# Patient Record
Sex: FEMALE | Race: WHITE | NOT HISPANIC OR LATINO
[De-identification: names, ages, dates, MRNs, and addresses within clinical notes are randomized per-mention and may not be internally consistent; named-entity substitution may affect disease eponyms.]

---

## 2016-03-22 RX ORDER — SUCRALFATE 1 G
1 TABLET ORAL
Qty: 0 | Refills: 0 | COMMUNITY
Start: 2016-03-22

## 2017-01-17 ENCOUNTER — APPOINTMENT (OUTPATIENT)
Dept: BREAST CENTER | Facility: CLINIC | Age: 82
End: 2017-01-17

## 2017-01-17 VITALS
HEART RATE: 78 BPM | WEIGHT: 132 LBS | SYSTOLIC BLOOD PRESSURE: 132 MMHG | BODY MASS INDEX: 21.99 KG/M2 | DIASTOLIC BLOOD PRESSURE: 76 MMHG | HEIGHT: 65 IN

## 2017-01-17 DIAGNOSIS — R41.3 OTHER AMNESIA: ICD-10-CM

## 2017-01-17 DIAGNOSIS — Z86.2 PERSONAL HISTORY OF DISEASES OF THE BLOOD AND BLOOD-FORMING ORGANS AND CERTAIN DISORDERS INVOLVING THE IMMUNE MECHANISM: ICD-10-CM

## 2017-01-17 DIAGNOSIS — Z86.79 PERSONAL HISTORY OF OTHER DISEASES OF THE CIRCULATORY SYSTEM: ICD-10-CM

## 2017-01-17 DIAGNOSIS — Z87.19 PERSONAL HISTORY OF OTHER DISEASES OF THE DIGESTIVE SYSTEM: ICD-10-CM

## 2017-01-17 DIAGNOSIS — F15.90 OTHER STIMULANT USE, UNSPECIFIED, UNCOMPLICATED: ICD-10-CM

## 2017-01-17 DIAGNOSIS — I72.9 ANEURYSM OF UNSPECIFIED SITE: ICD-10-CM

## 2017-01-17 DIAGNOSIS — N63 UNSPECIFIED LUMP IN BREAST: ICD-10-CM

## 2017-01-17 DIAGNOSIS — Z82.49 FAMILY HISTORY OF ISCHEMIC HEART DISEASE AND OTHER DISEASES OF THE CIRCULATORY SYSTEM: ICD-10-CM

## 2017-01-17 RX ORDER — LOSARTAN POTASSIUM 25 MG/1
25 TABLET, FILM COATED ORAL
Refills: 0 | Status: ACTIVE | COMMUNITY

## 2017-01-18 ENCOUNTER — TRANSCRIPTION ENCOUNTER (OUTPATIENT)
Age: 82
End: 2017-01-18

## 2017-01-20 ENCOUNTER — RESULT REVIEW (OUTPATIENT)
Age: 82
End: 2017-01-20

## 2017-01-30 ENCOUNTER — MESSAGE (OUTPATIENT)
Age: 82
End: 2017-01-30

## 2017-02-22 ENCOUNTER — APPOINTMENT (OUTPATIENT)
Dept: CARDIOLOGY | Facility: CLINIC | Age: 82
End: 2017-02-22

## 2017-02-22 ENCOUNTER — NON-APPOINTMENT (OUTPATIENT)
Age: 82
End: 2017-02-22

## 2017-02-22 VITALS
HEIGHT: 65 IN | SYSTOLIC BLOOD PRESSURE: 120 MMHG | WEIGHT: 133 LBS | DIASTOLIC BLOOD PRESSURE: 50 MMHG | BODY MASS INDEX: 22.16 KG/M2 | HEART RATE: 80 BPM

## 2017-02-22 DIAGNOSIS — E78.5 HYPERLIPIDEMIA, UNSPECIFIED: ICD-10-CM

## 2017-02-22 DIAGNOSIS — I34.0 NONRHEUMATIC MITRAL (VALVE) INSUFFICIENCY: ICD-10-CM

## 2017-02-22 DIAGNOSIS — I49.3 VENTRICULAR PREMATURE DEPOLARIZATION: ICD-10-CM

## 2017-02-22 DIAGNOSIS — Z01.810 ENCOUNTER FOR PREPROCEDURAL CARDIOVASCULAR EXAMINATION: ICD-10-CM

## 2017-03-03 ENCOUNTER — OUTPATIENT (OUTPATIENT)
Dept: OUTPATIENT SERVICES | Facility: HOSPITAL | Age: 82
LOS: 1 days | Discharge: HOME | End: 2017-03-03
Payer: MEDICARE

## 2017-03-03 DIAGNOSIS — M51.36 OTHER INTERVERTEBRAL DISC DEGENERATION, LUMBAR REGION: ICD-10-CM

## 2017-03-03 DIAGNOSIS — I34.0 NONRHEUMATIC MITRAL (VALVE) INSUFFICIENCY: ICD-10-CM

## 2017-03-03 DIAGNOSIS — M10.9 GOUT, UNSPECIFIED: ICD-10-CM

## 2017-03-03 DIAGNOSIS — I25.10 ATHEROSCLEROTIC HEART DISEASE OF NATIVE CORONARY ARTERY WITHOUT ANGINA PECTORIS: ICD-10-CM

## 2017-03-03 DIAGNOSIS — Z01.818 ENCOUNTER FOR OTHER PREPROCEDURAL EXAMINATION: ICD-10-CM

## 2017-03-03 DIAGNOSIS — H26.9 UNSPECIFIED CATARACT: Chronic | ICD-10-CM

## 2017-03-03 DIAGNOSIS — C50.912 MALIGNANT NEOPLASM OF UNSPECIFIED SITE OF LEFT FEMALE BREAST: ICD-10-CM

## 2017-03-03 DIAGNOSIS — D05.12 INTRADUCTAL CARCINOMA IN SITU OF LEFT BREAST: ICD-10-CM

## 2017-03-03 DIAGNOSIS — E11.9 TYPE 2 DIABETES MELLITUS WITHOUT COMPLICATIONS: ICD-10-CM

## 2017-03-03 DIAGNOSIS — K21.9 GASTRO-ESOPHAGEAL REFLUX DISEASE WITHOUT ESOPHAGITIS: ICD-10-CM

## 2017-03-03 DIAGNOSIS — Z86.79 PERSONAL HISTORY OF OTHER DISEASES OF THE CIRCULATORY SYSTEM: Chronic | ICD-10-CM

## 2017-03-03 LAB
ALBUMIN SERPL ELPH-MCNC: 4 G/DL — SIGNIFICANT CHANGE UP (ref 3.3–5)
ALP SERPL-CCNC: 111 U/L — SIGNIFICANT CHANGE UP (ref 40–120)
ALT FLD-CCNC: 27 U/L — SIGNIFICANT CHANGE UP (ref 12–78)
ANION GAP SERPL CALC-SCNC: 10 MMOL/L — SIGNIFICANT CHANGE UP (ref 5–17)
ANISOCYTOSIS BLD QL: SLIGHT — SIGNIFICANT CHANGE UP
APTT BLD: 27.3 SEC — LOW (ref 27.5–37.4)
AST SERPL-CCNC: 19 U/L — SIGNIFICANT CHANGE UP (ref 15–37)
BASOPHILS # BLD AUTO: 0 K/UL — SIGNIFICANT CHANGE UP (ref 0–0.2)
BASOPHILS NFR BLD AUTO: 0.4 % — SIGNIFICANT CHANGE UP (ref 0–2)
BILIRUB SERPL-MCNC: 0.4 MG/DL — SIGNIFICANT CHANGE UP (ref 0.2–1.2)
BUN SERPL-MCNC: 23 MG/DL — SIGNIFICANT CHANGE UP (ref 7–23)
CALCIUM SERPL-MCNC: 9.6 MG/DL — SIGNIFICANT CHANGE UP (ref 8.5–10.1)
CHLORIDE SERPL-SCNC: 104 MMOL/L — SIGNIFICANT CHANGE UP (ref 96–108)
CO2 SERPL-SCNC: 25 MMOL/L — SIGNIFICANT CHANGE UP (ref 22–31)
CREAT SERPL-MCNC: 1.29 MG/DL — SIGNIFICANT CHANGE UP (ref 0.5–1.3)
DACRYOCYTES BLD QL SMEAR: SLIGHT — SIGNIFICANT CHANGE UP
ELLIPTOCYTES BLD QL SMEAR: SLIGHT — SIGNIFICANT CHANGE UP
EOSINOPHIL # BLD AUTO: 0.1 K/UL — SIGNIFICANT CHANGE UP (ref 0–0.5)
EOSINOPHIL NFR BLD AUTO: 1.7 % — SIGNIFICANT CHANGE UP (ref 0–6)
GLUCOSE SERPL-MCNC: 112 MG/DL — HIGH (ref 70–99)
HCT VFR BLD CALC: 28 % — LOW (ref 34.5–45)
HGB BLD-MCNC: 8.6 G/DL — LOW (ref 11.5–15.5)
HYPOCHROMIA BLD QL: SLIGHT — SIGNIFICANT CHANGE UP
INR BLD: 1.07 RATIO — SIGNIFICANT CHANGE UP (ref 0.88–1.16)
LYMPHOCYTES # BLD AUTO: 1.2 K/UL — SIGNIFICANT CHANGE UP (ref 1–3.3)
LYMPHOCYTES # BLD AUTO: 20 % — SIGNIFICANT CHANGE UP (ref 13–44)
MANUAL DIF COMMENT BLD-IMP: SIGNIFICANT CHANGE UP
MCHC RBC-ENTMCNC: 21.5 PG — LOW (ref 27–34)
MCHC RBC-ENTMCNC: 30.7 GM/DL — LOW (ref 32–36)
MCV RBC AUTO: 70.1 FL — LOW (ref 80–100)
MICROCYTES BLD QL: SLIGHT — SIGNIFICANT CHANGE UP
MONOCYTES # BLD AUTO: 0.5 K/UL — SIGNIFICANT CHANGE UP (ref 0–0.9)
MONOCYTES NFR BLD AUTO: 7.6 % — SIGNIFICANT CHANGE UP (ref 2–14)
NEUTROPHILS # BLD AUTO: 4.3 K/UL — SIGNIFICANT CHANGE UP (ref 1.8–7.4)
NEUTROPHILS NFR BLD AUTO: 70.3 % — SIGNIFICANT CHANGE UP (ref 43–77)
OVALOCYTES BLD QL SMEAR: SLIGHT — SIGNIFICANT CHANGE UP
PLAT MORPH BLD: NORMAL — SIGNIFICANT CHANGE UP
PLATELET # BLD AUTO: 326 K/UL — SIGNIFICANT CHANGE UP (ref 150–400)
POIKILOCYTOSIS BLD QL AUTO: SLIGHT — SIGNIFICANT CHANGE UP
POLYCHROMASIA BLD QL SMEAR: SLIGHT — SIGNIFICANT CHANGE UP
POTASSIUM SERPL-MCNC: 4.2 MMOL/L — SIGNIFICANT CHANGE UP (ref 3.5–5.3)
POTASSIUM SERPL-SCNC: 4.2 MMOL/L — SIGNIFICANT CHANGE UP (ref 3.5–5.3)
PROT SERPL-MCNC: 7.8 GM/DL — SIGNIFICANT CHANGE UP (ref 6–8.3)
PROTHROM AB SERPL-ACNC: 11.8 SEC — SIGNIFICANT CHANGE UP (ref 10–13.1)
RBC # BLD: 3.99 M/UL — SIGNIFICANT CHANGE UP (ref 3.8–5.2)
RBC # FLD: 15.6 % — HIGH (ref 10.3–14.5)
RBC BLD AUTO: (no result)
SODIUM SERPL-SCNC: 139 MMOL/L — SIGNIFICANT CHANGE UP (ref 135–145)
WBC # BLD: 6.2 K/UL — SIGNIFICANT CHANGE UP (ref 3.8–10.5)
WBC # FLD AUTO: 6.2 K/UL — SIGNIFICANT CHANGE UP (ref 3.8–10.5)

## 2017-03-03 PROCEDURE — 93010 ELECTROCARDIOGRAM REPORT: CPT

## 2017-03-03 NOTE — CHART NOTE - NSCHARTNOTEFT_GEN_A_CORE
Patient christine in PST encounter today:     V/S: T-98.5, P-76, R-14, B/P-141/70, o2 sat-99% on room air.    Ht: 66", wt: 65kgs    EZ sponges, holistic sheet, and day of procedure instructions provided and reviewed with patient an daughter.

## 2017-03-03 NOTE — ASU PATIENT PROFILE, ADULT - PSH
Cataract, acquired  surgery does not rmember the date or laterality   delivery delivered  1950  H/O aneurysm  renal; s/p repair 1973

## 2017-03-03 NOTE — ASU PATIENT PROFILE, ADULT - ABILITY TO HEAR (WITH HEARING AID OR HEARING APPLIANCE IF NORMALLY USED):
uses hearing aides/Mildly to Moderately Impaired: difficulty hearing in some environments or speaker may need to increase volume or speak distinctly

## 2017-03-03 NOTE — ASU PATIENT PROFILE, ADULT - VISION (WITH CORRECTIVE LENSES IF THE PATIENT USUALLY WEARS THEM):
Normal vision: sees adequately in most situations; can see medication labels, newsprint/nearsighted/farsighted

## 2017-03-03 NOTE — ASU PATIENT PROFILE, ADULT - PMH
Aortic dissection, abdominal  treated medically and under obeservation as per daughter  AVM (arteriovenous malformation)    Back pain  Comporession fx L4  Breast pain, left    Carpal tunnel syndrome    Ductal carcinoma in situ (DCIS) of left breast    Esophageal erosions    Hearing loss, unspecified laterality  uses haring aides B/L  Hyperlipidemia    Hypertension    Osteoarthritis    Renal cyst    Retina disorder

## 2017-03-14 ENCOUNTER — RESULT REVIEW (OUTPATIENT)
Age: 82
End: 2017-03-14

## 2017-03-14 RX ORDER — SODIUM CHLORIDE 9 MG/ML
3 INJECTION INTRAMUSCULAR; INTRAVENOUS; SUBCUTANEOUS EVERY 8 HOURS
Qty: 0 | Refills: 0 | Status: DISCONTINUED | OUTPATIENT
Start: 2017-03-15 | End: 2017-03-15

## 2017-03-15 ENCOUNTER — OUTPATIENT (OUTPATIENT)
Dept: OUTPATIENT SERVICES | Facility: HOSPITAL | Age: 82
LOS: 1 days | Discharge: HOME | End: 2017-03-15
Payer: MEDICARE

## 2017-03-15 ENCOUNTER — APPOINTMENT (OUTPATIENT)
Dept: BREAST CENTER | Facility: HOSPITAL | Age: 82
End: 2017-03-15

## 2017-03-15 VITALS
OXYGEN SATURATION: 98 % | WEIGHT: 133.38 LBS | HEART RATE: 86 BPM | SYSTOLIC BLOOD PRESSURE: 128 MMHG | HEIGHT: 65 IN | TEMPERATURE: 98 F | DIASTOLIC BLOOD PRESSURE: 58 MMHG | RESPIRATION RATE: 16 BRPM

## 2017-03-15 VITALS
SYSTOLIC BLOOD PRESSURE: 129 MMHG | OXYGEN SATURATION: 100 % | HEART RATE: 84 BPM | TEMPERATURE: 98 F | DIASTOLIC BLOOD PRESSURE: 66 MMHG | RESPIRATION RATE: 16 BRPM

## 2017-03-15 DIAGNOSIS — Z96.649 PRESENCE OF UNSPECIFIED ARTIFICIAL HIP JOINT: Chronic | ICD-10-CM

## 2017-03-15 DIAGNOSIS — Z86.79 PERSONAL HISTORY OF OTHER DISEASES OF THE CIRCULATORY SYSTEM: Chronic | ICD-10-CM

## 2017-03-15 DIAGNOSIS — H26.9 UNSPECIFIED CATARACT: Chronic | ICD-10-CM

## 2017-03-15 PROCEDURE — 88307 TISSUE EXAM BY PATHOLOGIST: CPT | Mod: 26

## 2017-03-15 PROCEDURE — 88329 PATH CONSLTJ DRG SURG: CPT

## 2017-03-15 RX ORDER — SODIUM CHLORIDE 9 MG/ML
1000 INJECTION INTRAMUSCULAR; INTRAVENOUS; SUBCUTANEOUS
Qty: 0 | Refills: 0 | Status: DISCONTINUED | OUTPATIENT
Start: 2017-03-15 | End: 2017-03-15

## 2017-03-15 RX ORDER — TRAMADOL HYDROCHLORIDE 50 MG/1
1 TABLET ORAL
Qty: 30 | Refills: 0 | OUTPATIENT
Start: 2017-03-15

## 2017-03-15 RX ORDER — FENTANYL CITRATE 50 UG/ML
50 INJECTION INTRAVENOUS
Qty: 0 | Refills: 0 | Status: DISCONTINUED | OUTPATIENT
Start: 2017-03-15 | End: 2017-03-15

## 2017-03-15 RX ORDER — SODIUM CHLORIDE 9 MG/ML
1000 INJECTION, SOLUTION INTRAVENOUS
Qty: 0 | Refills: 0 | Status: DISCONTINUED | OUTPATIENT
Start: 2017-03-15 | End: 2017-03-30

## 2017-03-15 RX ORDER — ONDANSETRON 8 MG/1
4 TABLET, FILM COATED ORAL EVERY 6 HOURS
Qty: 0 | Refills: 0 | Status: DISCONTINUED | OUTPATIENT
Start: 2017-03-15 | End: 2017-03-30

## 2017-03-15 RX ORDER — ACETAMINOPHEN 500 MG
1000 TABLET ORAL ONCE
Qty: 0 | Refills: 0 | Status: COMPLETED | OUTPATIENT
Start: 2017-03-15 | End: 2017-03-15

## 2017-03-15 RX ORDER — ONDANSETRON 8 MG/1
4 TABLET, FILM COATED ORAL ONCE
Qty: 0 | Refills: 0 | Status: DISCONTINUED | OUTPATIENT
Start: 2017-03-15 | End: 2017-03-15

## 2017-03-15 RX ADMIN — Medication 400 MILLIGRAM(S): at 12:11

## 2017-03-15 RX ADMIN — SODIUM CHLORIDE 75 MILLILITER(S): 9 INJECTION INTRAMUSCULAR; INTRAVENOUS; SUBCUTANEOUS at 12:12

## 2017-03-15 NOTE — BRIEF OPERATIVE NOTE - PROCEDURE
Mastopexy of left breast  03/15/2017    Active  AMBERKIT  Lumpectomy of left breast  03/15/2017    Active  AMBERSHKIT

## 2017-03-15 NOTE — ASU PATIENT PROFILE, ADULT - VISION (WITH CORRECTIVE LENSES IF THE PATIENT USUALLY WEARS THEM):
nearsighted/farsighted/Normal vision: sees adequately in most situations; can see medication labels, newsprint

## 2017-03-15 NOTE — ASU DISCHARGE PLAN (ADULT/PEDIATRIC). - NURSING INSTRUCTIONS
For any problems or concerns,contact your doctor. [facility] patients should call the [facility]. If you cannot reach the doctor or clinic, call [facility] Emergency Department at [phone] or go to your local Emergency Department.  A responsible adult should be with you for the rest of the day and night for your safety and to help you if you needed. Resume your medications as listed on the attached Medication Reconciliation. Start with light diet

## 2017-03-15 NOTE — ASU DISCHARGE PLAN (ADULT/PEDIATRIC). - MEDICATION SUMMARY - MEDICATIONS TO TAKE
I will START or STAY ON the medications listed below when I get home from the hospital:    spironolactone 25 mg oral tablet  -- 1 tab(s) by mouth once a day - home/hospital  -- Indication: For Hypertension    acetaminophen 325 mg oral tablet  -- 2 tab(s) by mouth every 4 hours, As Needed  -- Indication: For pain    traMADol 50 mg oral tablet  --  by mouth , As Needed  -- Indication: For pain    traMADol 50 mg oral tablet  -- 1 tab(s) by mouth every 8 hours, As Needed -for moderate pain MDD:150 mg  -- Caution federal law prohibits the transfer of this drug to any person other  than the person for whom it was prescribed.  May cause drowsiness.  Alcohol may intensify this effect.  Use care when operating dangerous machinery.  Obtain medical advice before taking any non-prescription drugs as some may affect the action of this medication.    -- Indication: For pain    losartan 25 mg oral tablet  -- 1 tab(s) by mouth once a day  -- Indication: For Hypertension    aluminum hydroxide-magnesium hydroxide 200 mg-200 mg/5 mL oral suspension  -- 30 milliliter(s) by mouth every 4 hours, As needed, Dyspepsia  -- Indication: For Health maintenance    allopurinol 100 mg oral tablet  --  by mouth once a day  -- Indication: For gout    simvastatin 20 mg oral tablet  -- 2 tab(s) by mouth once a day (at bedtime) - Home/Hospital  -- Indication: For cholesterol    amLODIPine 10 mg oral tablet  -- 1 tab(s) by mouth once a day  -- Indication: For Hypertension    sucralfate 1 g oral tablet  -- 1 tab(s) by mouth 4 times a day  -- Indication: For GI    pantoprazole 40 mg oral delayed release tablet  -- 1 tab(s) by mouth 2 times a day (before meals)  -- Indication: For reflux    Vitamin B12  -- 1000 microgram(s) under tongue once a day  -- Indication: For Health maintenance    Vitamin D3 1000 intl units oral capsule  -- 2  by mouth once a day  -- Indication: For vitamin

## 2017-03-15 NOTE — ASU PATIENT PROFILE, ADULT - PSH
Cataract, acquired  surgery does not rmember the date or laterality   delivery delivered  1950  H/O aneurysm  renal; s/p repair   History of hip replacement

## 2017-03-20 LAB — SURGICAL PATHOLOGY FINAL REPORT - CH: SIGNIFICANT CHANGE UP

## 2017-03-21 DIAGNOSIS — I86.8 VARICOSE VEINS OF OTHER SPECIFIED SITES: ICD-10-CM

## 2017-03-21 DIAGNOSIS — M10.9 GOUT, UNSPECIFIED: ICD-10-CM

## 2017-03-21 DIAGNOSIS — I25.10 ATHEROSCLEROTIC HEART DISEASE OF NATIVE CORONARY ARTERY WITHOUT ANGINA PECTORIS: ICD-10-CM

## 2017-03-21 DIAGNOSIS — H35.30 UNSPECIFIED MACULAR DEGENERATION: ICD-10-CM

## 2017-03-21 DIAGNOSIS — I34.0 NONRHEUMATIC MITRAL (VALVE) INSUFFICIENCY: ICD-10-CM

## 2017-03-21 DIAGNOSIS — M51.36 OTHER INTERVERTEBRAL DISC DEGENERATION, LUMBAR REGION: ICD-10-CM

## 2017-03-21 DIAGNOSIS — M48.07 SPINAL STENOSIS, LUMBOSACRAL REGION: ICD-10-CM

## 2017-03-21 DIAGNOSIS — C50.912 MALIGNANT NEOPLASM OF UNSPECIFIED SITE OF LEFT FEMALE BREAST: ICD-10-CM

## 2017-03-21 DIAGNOSIS — E11.9 TYPE 2 DIABETES MELLITUS WITHOUT COMPLICATIONS: ICD-10-CM

## 2017-03-21 DIAGNOSIS — K21.9 GASTRO-ESOPHAGEAL REFLUX DISEASE WITHOUT ESOPHAGITIS: ICD-10-CM

## 2017-03-21 DIAGNOSIS — I11.9 HYPERTENSIVE HEART DISEASE WITHOUT HEART FAILURE: ICD-10-CM

## 2017-03-27 ENCOUNTER — APPOINTMENT (OUTPATIENT)
Dept: BREAST CENTER | Facility: CLINIC | Age: 82
End: 2017-03-27

## 2017-03-27 DIAGNOSIS — N64.89 OTHER SPECIFIED DISORDERS OF BREAST: ICD-10-CM

## 2017-10-02 ENCOUNTER — EMERGENCY (EMERGENCY)
Facility: HOSPITAL | Age: 82
LOS: 0 days | Discharge: HOME | End: 2017-10-02
Attending: EMERGENCY MEDICINE | Admitting: EMERGENCY MEDICINE
Payer: MEDICARE

## 2017-10-02 VITALS
SYSTOLIC BLOOD PRESSURE: 133 MMHG | TEMPERATURE: 98 F | HEART RATE: 82 BPM | RESPIRATION RATE: 18 BRPM | DIASTOLIC BLOOD PRESSURE: 57 MMHG

## 2017-10-02 VITALS
DIASTOLIC BLOOD PRESSURE: 71 MMHG | OXYGEN SATURATION: 95 % | RESPIRATION RATE: 18 BRPM | HEART RATE: 81 BPM | SYSTOLIC BLOOD PRESSURE: 136 MMHG | TEMPERATURE: 98 F | HEIGHT: 65 IN | WEIGHT: 149.91 LBS

## 2017-10-02 DIAGNOSIS — Z86.000 PERSONAL HISTORY OF IN-SITU NEOPLASM OF BREAST: ICD-10-CM

## 2017-10-02 DIAGNOSIS — I10 ESSENTIAL (PRIMARY) HYPERTENSION: ICD-10-CM

## 2017-10-02 DIAGNOSIS — M19.90 UNSPECIFIED OSTEOARTHRITIS, UNSPECIFIED SITE: ICD-10-CM

## 2017-10-02 DIAGNOSIS — H65.92 UNSPECIFIED NONSUPPURATIVE OTITIS MEDIA, LEFT EAR: ICD-10-CM

## 2017-10-02 DIAGNOSIS — Z82.49 FAMILY HISTORY OF ISCHEMIC HEART DISEASE AND OTHER DISEASES OF THE CIRCULATORY SYSTEM: ICD-10-CM

## 2017-10-02 DIAGNOSIS — E78.5 HYPERLIPIDEMIA, UNSPECIFIED: ICD-10-CM

## 2017-10-02 DIAGNOSIS — Z87.448 PERSONAL HISTORY OF OTHER DISEASES OF URINARY SYSTEM: ICD-10-CM

## 2017-10-02 DIAGNOSIS — H26.9 UNSPECIFIED CATARACT: Chronic | ICD-10-CM

## 2017-10-02 DIAGNOSIS — H92.09 OTALGIA, UNSPECIFIED EAR: ICD-10-CM

## 2017-10-02 DIAGNOSIS — I71.02 DISSECTION OF ABDOMINAL AORTA: ICD-10-CM

## 2017-10-02 DIAGNOSIS — Q27.30 ARTERIOVENOUS MALFORMATION, SITE UNSPECIFIED: ICD-10-CM

## 2017-10-02 DIAGNOSIS — Z96.649 PRESENCE OF UNSPECIFIED ARTIFICIAL HIP JOINT: Chronic | ICD-10-CM

## 2017-10-02 DIAGNOSIS — H35.9 UNSPECIFIED RETINAL DISORDER: ICD-10-CM

## 2017-10-02 DIAGNOSIS — H91.93 UNSPECIFIED HEARING LOSS, BILATERAL: ICD-10-CM

## 2017-10-02 DIAGNOSIS — Z86.79 PERSONAL HISTORY OF OTHER DISEASES OF THE CIRCULATORY SYSTEM: Chronic | ICD-10-CM

## 2017-10-02 PROCEDURE — 99283 EMERGENCY DEPT VISIT LOW MDM: CPT | Mod: 25

## 2017-10-02 RX ORDER — AMOXICILLIN 250 MG/5ML
1 SUSPENSION, RECONSTITUTED, ORAL (ML) ORAL
Qty: 30 | Refills: 0 | OUTPATIENT
Start: 2017-10-02 | End: 2017-10-12

## 2017-10-02 RX ORDER — ACETAMINOPHEN 500 MG
650 TABLET ORAL ONCE
Qty: 0 | Refills: 0 | Status: COMPLETED | OUTPATIENT
Start: 2017-10-02 | End: 2017-10-02

## 2017-10-02 RX ADMIN — Medication 1 TABLET(S): at 07:20

## 2017-10-02 RX ADMIN — Medication 650 MILLIGRAM(S): at 07:20

## 2017-10-02 NOTE — ED PROVIDER NOTE - OBJECTIVE STATEMENT
89 y/o female presents to the ED c/o left sided ear pain "for ages".  Son states she has never complained about ear pain previously so he would like to have her evaluated for an ear infection.  Pt denies teeth pain, sore throat, n/v/d, abd pain 89 y/o female with PMHx HTN, HLD, OA, AVM, aortic dissection presents to the ED from [place] [facility] c/o left sided ear pain "for ages".  Son states she has never complained about ear pain previously so he would like to have her evaluated for an ear infection.  Pt denies teeth pain, sore throat, n/v/d, abd pain 89 y/o female with PMHx HTN, HLD, OA, AVM, aortic dissection presents to the ED from [place] [facility] c/o left sided ear pain "for ages".  Son states she has never complained about ear pain previously so he would like to have her evaluated for an ear infection.  Pt denies teeth pain, sore throat, n/v/d, abd pain.  No fevers.

## 2017-10-02 NOTE — ED ADULT NURSE NOTE - CAS EDN DISCHARGE ASSESSMENT
Patient baseline mental status/Awake/No adverse reaction to first time med in ED/Alert and oriented to person, place and time

## 2017-10-02 NOTE — ED PROVIDER NOTE - ENMT, MLM
Airway patent, Nasal mucosa clear. Mouth with normal mucosa. Throat has no vesicles, no oropharyngeal exudates and uvula is midline.  Right TM clear.  Left TM partially occluded by cerumen, TM without erythema but with bulging effusion Airway patent, Nasal mucosa clear. Mouth with normal mucosa. Throat has no vesicles, no oropharyngeal exudates and uvula is midline.  Right TM clear.  Left TM partially occluded by cerumen, TM without erythema but with bulging effusion.  Distress to look into ear.  No mastoid erythema or tenderness

## 2017-10-02 NOTE — ED ADULT NURSE NOTE - OBJECTIVE STATEMENT
Pt presents to ER from assisted living c/o left ear pain. Pt reports onset of symptoms began at 0300 this morning. Pt denies drainage or trauma, skin intact, muffled hearing. Left ear is non erythremic, no foreign body noticed. AO x 3 oriented to baseline, normal breathing pattern with no difficulty

## 2017-10-25 ENCOUNTER — INPATIENT (INPATIENT)
Facility: HOSPITAL | Age: 82
LOS: 1 days | Discharge: HOME | End: 2017-10-27
Attending: HOSPITALIST | Admitting: INTERNAL MEDICINE
Payer: MEDICARE

## 2017-10-25 VITALS
HEART RATE: 78 BPM | SYSTOLIC BLOOD PRESSURE: 129 MMHG | RESPIRATION RATE: 18 BRPM | HEIGHT: 65 IN | WEIGHT: 190.04 LBS | TEMPERATURE: 98 F | DIASTOLIC BLOOD PRESSURE: 60 MMHG | OXYGEN SATURATION: 99 %

## 2017-10-25 DIAGNOSIS — Z96.649 PRESENCE OF UNSPECIFIED ARTIFICIAL HIP JOINT: Chronic | ICD-10-CM

## 2017-10-25 DIAGNOSIS — Z86.79 PERSONAL HISTORY OF OTHER DISEASES OF THE CIRCULATORY SYSTEM: Chronic | ICD-10-CM

## 2017-10-25 DIAGNOSIS — H26.9 UNSPECIFIED CATARACT: Chronic | ICD-10-CM

## 2017-10-25 LAB
ACANTHOCYTES BLD QL SMEAR: SLIGHT — SIGNIFICANT CHANGE UP
ADD ON TEST-SPECIMEN IN LAB: SIGNIFICANT CHANGE UP
ALBUMIN SERPL ELPH-MCNC: 3.4 G/DL — SIGNIFICANT CHANGE UP (ref 3.3–5)
ALP SERPL-CCNC: 111 U/L — SIGNIFICANT CHANGE UP (ref 40–120)
ALT FLD-CCNC: 26 U/L — SIGNIFICANT CHANGE UP (ref 12–78)
ANION GAP SERPL CALC-SCNC: 9 MMOL/L — SIGNIFICANT CHANGE UP (ref 5–17)
ANISOCYTOSIS BLD QL: SLIGHT — SIGNIFICANT CHANGE UP
APTT BLD: 24.4 SEC — LOW (ref 27.5–37.4)
AST SERPL-CCNC: 18 U/L — SIGNIFICANT CHANGE UP (ref 15–37)
BASO STIPL BLD QL SMEAR: PRESENT — SIGNIFICANT CHANGE UP
BASOPHILS # BLD AUTO: 0 K/UL — SIGNIFICANT CHANGE UP (ref 0–0.2)
BASOPHILS NFR BLD AUTO: 0.4 % — SIGNIFICANT CHANGE UP (ref 0–2)
BILIRUB SERPL-MCNC: 0.4 MG/DL — SIGNIFICANT CHANGE UP (ref 0.2–1.2)
BLD GP AB SCN SERPL QL: SIGNIFICANT CHANGE UP
BUN SERPL-MCNC: 26 MG/DL — HIGH (ref 7–23)
CALCIUM SERPL-MCNC: 8.7 MG/DL — SIGNIFICANT CHANGE UP (ref 8.5–10.1)
CHLORIDE SERPL-SCNC: 100 MMOL/L — SIGNIFICANT CHANGE UP (ref 96–108)
CO2 SERPL-SCNC: 21 MMOL/L — LOW (ref 22–31)
CREAT SERPL-MCNC: 1.42 MG/DL — HIGH (ref 0.5–1.3)
ELLIPTOCYTES BLD QL SMEAR: SLIGHT — SIGNIFICANT CHANGE UP
EOSINOPHIL # BLD AUTO: 0.1 K/UL — SIGNIFICANT CHANGE UP (ref 0–0.5)
EOSINOPHIL NFR BLD AUTO: 1.1 % — SIGNIFICANT CHANGE UP (ref 0–6)
GLUCOSE SERPL-MCNC: 142 MG/DL — HIGH (ref 70–99)
HCT VFR BLD CALC: 18.1 % — CRITICAL LOW (ref 34.5–45)
HGB BLD-MCNC: 5.5 G/DL — CRITICAL LOW (ref 11.5–15.5)
HYPOCHROMIA BLD QL: SIGNIFICANT CHANGE UP
INR BLD: 1.15 RATIO — SIGNIFICANT CHANGE UP (ref 0.88–1.16)
LYMPHOCYTES # BLD AUTO: 0.8 K/UL — LOW (ref 1–3.3)
LYMPHOCYTES # BLD AUTO: 12 % — LOW (ref 13–44)
MANUAL DIF COMMENT BLD-IMP: SIGNIFICANT CHANGE UP
MCHC RBC-ENTMCNC: 18.2 PG — LOW (ref 27–34)
MCHC RBC-ENTMCNC: 30.5 GM/DL — LOW (ref 32–36)
MCV RBC AUTO: 59.5 FL — LOW (ref 80–100)
MICROCYTES BLD QL: SIGNIFICANT CHANGE UP
MONOCYTES # BLD AUTO: 0.8 K/UL — SIGNIFICANT CHANGE UP (ref 0–0.9)
MONOCYTES NFR BLD AUTO: 11.8 % — SIGNIFICANT CHANGE UP (ref 2–14)
NEUTROPHILS # BLD AUTO: 5.3 K/UL — SIGNIFICANT CHANGE UP (ref 1.8–7.4)
NEUTROPHILS NFR BLD AUTO: 74.6 % — SIGNIFICANT CHANGE UP (ref 43–77)
PLAT MORPH BLD: NORMAL — SIGNIFICANT CHANGE UP
PLATELET # BLD AUTO: 325 K/UL — SIGNIFICANT CHANGE UP (ref 150–400)
POIKILOCYTOSIS BLD QL AUTO: SLIGHT — SIGNIFICANT CHANGE UP
POLYCHROMASIA BLD QL SMEAR: SLIGHT — SIGNIFICANT CHANGE UP
POTASSIUM SERPL-MCNC: 4.2 MMOL/L — SIGNIFICANT CHANGE UP (ref 3.5–5.3)
POTASSIUM SERPL-SCNC: 4.2 MMOL/L — SIGNIFICANT CHANGE UP (ref 3.5–5.3)
PROT SERPL-MCNC: 6.9 GM/DL — SIGNIFICANT CHANGE UP (ref 6–8.3)
PROTHROM AB SERPL-ACNC: 12.5 SEC — SIGNIFICANT CHANGE UP (ref 9.8–12.7)
RBC # BLD: 3.04 M/UL — LOW (ref 3.8–5.2)
RBC # FLD: 14.6 % — HIGH (ref 10.3–14.5)
RBC BLD AUTO: (no result)
SODIUM SERPL-SCNC: 130 MMOL/L — LOW (ref 135–145)
TROPONIN I SERPL-MCNC: 0.03 NG/ML — SIGNIFICANT CHANGE UP (ref 0.01–0.04)
TYPE + AB SCN PNL BLD: SIGNIFICANT CHANGE UP
WBC # BLD: 7.1 K/UL — SIGNIFICANT CHANGE UP (ref 3.8–10.5)
WBC # FLD AUTO: 7.1 K/UL — SIGNIFICANT CHANGE UP (ref 3.8–10.5)

## 2017-10-25 PROCEDURE — 93010 ELECTROCARDIOGRAM REPORT: CPT

## 2017-10-25 PROCEDURE — 71010: CPT | Mod: 26

## 2017-10-25 PROCEDURE — 99285 EMERGENCY DEPT VISIT HI MDM: CPT

## 2017-10-25 RX ORDER — PANTOPRAZOLE SODIUM 20 MG/1
40 TABLET, DELAYED RELEASE ORAL ONCE
Qty: 0 | Refills: 0 | Status: COMPLETED | OUTPATIENT
Start: 2017-10-25 | End: 2017-10-25

## 2017-10-25 RX ORDER — OCTREOTIDE ACETATE 200 UG/ML
50 INJECTION, SOLUTION INTRAVENOUS; SUBCUTANEOUS ONCE
Qty: 0 | Refills: 0 | Status: COMPLETED | OUTPATIENT
Start: 2017-10-25 | End: 2017-10-25

## 2017-10-25 RX ORDER — SODIUM CHLORIDE 9 MG/ML
1000 INJECTION INTRAMUSCULAR; INTRAVENOUS; SUBCUTANEOUS ONCE
Qty: 0 | Refills: 0 | Status: COMPLETED | OUTPATIENT
Start: 2017-10-25 | End: 2017-10-25

## 2017-10-25 RX ORDER — OCTREOTIDE ACETATE 200 UG/ML
2 INJECTION, SOLUTION INTRAVENOUS; SUBCUTANEOUS
Qty: 500 | Refills: 0 | Status: DISCONTINUED | OUTPATIENT
Start: 2017-10-25 | End: 2017-10-25

## 2017-10-25 RX ORDER — PANTOPRAZOLE SODIUM 20 MG/1
8 TABLET, DELAYED RELEASE ORAL
Qty: 80 | Refills: 0 | Status: DISCONTINUED | OUTPATIENT
Start: 2017-10-25 | End: 2017-10-26

## 2017-10-25 RX ADMIN — SODIUM CHLORIDE 1000 MILLILITER(S): 9 INJECTION INTRAMUSCULAR; INTRAVENOUS; SUBCUTANEOUS at 20:59

## 2017-10-25 RX ADMIN — PANTOPRAZOLE SODIUM 10 MG/HR: 20 TABLET, DELAYED RELEASE ORAL at 23:13

## 2017-10-25 NOTE — ED PROVIDER NOTE - OBJECTIVE STATEMENT
89 y/o female with PMHx of aortic dissection presents to the ED from [facility] living for low Hb. Son states pt had similar episode last year and was admitted, was worked up, received a transfusion. States aortic dissection was stable as of last year. Son also notes gradual PLATT. Pt denies melena, bloody stool, abd pain, epistaxis, CP. Pt has chronic back pain and is followed by pain management. Pt is asymptomatic at this time. 89 y/o female with PMHx of aortic dissection presents to the ED from [facility] living for low Hb. Son states pt had similar episode last year and was admitted, was worked up, received a transfusion. States aortic dissection was stable as of last year. Son also notes gradual PLATT. Pt denies melena, bloody stool, abd pain, epistaxis, CP. Pt has chronic back pain and is followed by pain management. Pt is asymptomatic at this time. GI Dena

## 2017-10-25 NOTE — ED PROVIDER NOTE - NS_ ATTENDINGSCRIBEDETAILS _ED_A_ED_FT
I, Sherif Hutchinson MD,  performed the initial face to face bedside interview with this patient regarding history of present illness, review of symptoms and relevant past medical, social and family history.  I completed an independent physical examination.    The history, relevant review of systems, past medical and surgical history, medical decision making, and physical examination was documented by the scribe in my presence and I attest to the accuracy of the documentation.

## 2017-10-25 NOTE — ED ADULT TRIAGE NOTE - CHIEF COMPLAINT QUOTE
pt [facility] for "[facility] hemoglobin," ems states that she felt very weak and lightheaded once she got up and sat on the stretcher  to come to hospital.

## 2017-10-25 NOTE — CONSULT NOTE ADULT - SUBJECTIVE AND OBJECTIVE BOX
Patient is 89yo female with PMhx HTN, HLD, OA, AVM, GIB, presents from NH for abnormal lab values, Hgb noted to be 7.0. Pt denies fever, chills, cough, chest pain, sob, palpitations, HA, dizziness, melena, abd pain, n/v/d. No other constitutional symptoms. Todays HH 5.5, baseline 8-9, receiving prbc transfusion. SBP ranging 130-140s, HR 80s, comfortable  lying in bed, watching TV.     PMhx as above  PSHx as above  Allergies Oxycodone  Meds as per EMR  FHx NC      T(C): 36.7 (10-25-17 @ 22:30), Max: 36.8 (10-25-17 @ 22:13)  HR: 83 (10-25-17 @ 22:30) (78 - 85)  BP: 134/77 (10-25-17 @ 22:30) (129/60 - 135/73)  RR: 19 (10-25-17 @ 22:30) (18 - 20)  SpO2: 97% (10-25-17 @ 22:30) (97% - 100%)  Wt(kg): --    Gen: AAOx3, NAD  HEENT: NCAT, EOMI, pale conjuctiva  Neck: Supple  CV: nml S1S2, RRR  Lungs: CTABL  Abd: Soft, NT, ND, BS+  Ext: No edema  Neuro: Non focal    CARDIAC MARKERS ( 25 Oct 2017 20:08 )  0.032 ng/mL / x     / x     / x     / x                                5.5    7.1   )-----------( 325      ( 25 Oct 2017 20:08 )             18.1     25 Oct 2017 20:08    130    |  100    |  26     ----------------------------<  142    4.2     |  21     |  1.42     Ca    8.7        25 Oct 2017 20:08    TPro  6.9    /  Alb  3.4    /  TBili  0.4    /  DBili  x      /  AST  18     /  ALT  26     /  AlkPhos  111    25 Oct 2017 20:08    PT/INR - ( 25 Oct 2017 20:08 )   PT: 12.5 sec;   INR: 1.15 ratio         PTT - ( 25 Oct 2017 20:08 )  PTT:24.4 sec  CAPILLARY BLOOD GLUCOSE        LIVER FUNCTIONS - ( 25 Oct 2017 20:08 )  Alb: 3.4 g/dL / Pro: 6.9 gm/dL / ALK PHOS: 111 U/L / ALT: 26 U/L / AST: 18 U/L / GGT: x

## 2017-10-25 NOTE — ED ADULT NURSE NOTE - OBJECTIVE STATEMENT
pt arrives to ED sent in from [facility] assisted living for "low hemoglobin." [facility] did not send paperwork stating the hgb level. pt alert and oriented x 4. as per EMS pt used the bathroom before transport and felt lightheaded and dizzy. at this time pt is without complaints. denies nausea, cp, sob, dizziness, and diarrhea. pt denies any change in bowel movements. vss. labs drawn, PIV initiated.

## 2017-10-25 NOTE — CONSULT NOTE ADULT - ASSESSMENT
89yo female a/w anemia, gib      GIB/Anemia    - currently afebrile, HD stable, last /90, HR 80, AAox#, NAD, comfortable in bed, watching TV  - On exam benign abd exam  - receiving pRBC    Recommend:  - transfuse 2u PRBC  - monitor resp status  - check iron, folate, TIBC, retic count  - GI eval  - PPI drip  - hold BP meds  - SCDs  - STABLE, does not require MICU level of care at this time 89yo female a/w anemia, gib      GIB/Anemia    - currently afebrile, HD stable, last /90, HR 80, AAox#, NAD, comfortable in bed, watching TV  - On exam benign abd exam  - receiving pRBC    Recommend:  - transfuse 2u PRBC  - monitor resp status  - check iron, folate, TIBC, retic count  - GI eval  - PPI drip  - hold BP meds  - SCDs  - STABLE, does not require MICU level of care at this time    critical care time 35 minutes

## 2017-10-25 NOTE — ED PROVIDER NOTE - CONSTITUTIONAL, MLM
normal... Pallor, well nourished, awake, alert, oriented to person, place, time/situation and in no apparent distress.

## 2017-10-25 NOTE — ED PROVIDER NOTE - PROGRESS NOTE DETAILS
Guaiac positive. Lot no 103. QC pass. ED attending Dr. Hutchinson discussed case with Dr. Parada, GI who advised to not start octreotide drip but can get bolus. Do Protonix bolus and drip and will see pt in the morning. I have consulted the ICU for eval given symptoms and low h/h.

## 2017-10-25 NOTE — ED ADULT NURSE NOTE - CHPI ED SYMPTOMS NEG
no fever/no tingling/no weakness/no pain/no nausea/no decreased eating/drinking/no vomiting/no numbness/no dizziness

## 2017-10-26 LAB
FERRITIN SERPL-MCNC: 12 NG/ML — LOW (ref 15–150)
HAPTOGLOB SERPL-MCNC: 284 MG/DL — HIGH (ref 34–200)
HCT VFR BLD CALC: 24.6 % — LOW (ref 34.5–45)
HCT VFR BLD CALC: 25.1 % — LOW (ref 34.5–45)
HCT VFR BLD CALC: 27.4 % — LOW (ref 34.5–45)
HGB BLD-MCNC: 7.8 G/DL — LOW (ref 11.5–15.5)
HGB BLD-MCNC: 8.1 G/DL — LOW (ref 11.5–15.5)
HGB BLD-MCNC: 8.8 G/DL — LOW (ref 11.5–15.5)
LDH SERPL L TO P-CCNC: 207 U/L — SIGNIFICANT CHANGE UP (ref 84–241)
LDH SERPL L TO P-CCNC: 226 U/L — SIGNIFICANT CHANGE UP (ref 84–241)
MCHC RBC-ENTMCNC: 20.9 PG — LOW (ref 27–34)
MCHC RBC-ENTMCNC: 21.2 PG — LOW (ref 27–34)
MCHC RBC-ENTMCNC: 21.5 PG — LOW (ref 27–34)
MCHC RBC-ENTMCNC: 31.9 GM/DL — LOW (ref 32–36)
MCHC RBC-ENTMCNC: 32 GM/DL — SIGNIFICANT CHANGE UP (ref 32–36)
MCHC RBC-ENTMCNC: 32.3 GM/DL — SIGNIFICANT CHANGE UP (ref 32–36)
MCV RBC AUTO: 65.2 FL — LOW (ref 80–100)
MCV RBC AUTO: 66.3 FL — LOW (ref 80–100)
MCV RBC AUTO: 66.6 FL — LOW (ref 80–100)
PLATELET # BLD AUTO: 267 K/UL — SIGNIFICANT CHANGE UP (ref 150–400)
PLATELET # BLD AUTO: 284 K/UL — SIGNIFICANT CHANGE UP (ref 150–400)
PLATELET # BLD AUTO: 321 K/UL — SIGNIFICANT CHANGE UP (ref 150–400)
RBC # BLD: 3.06 M/UL — LOW (ref 3.8–5.2)
RBC # BLD: 3.71 M/UL — LOW (ref 3.8–5.2)
RBC # BLD: 3.77 M/UL — LOW (ref 3.8–5.2)
RBC # BLD: 4.21 M/UL — SIGNIFICANT CHANGE UP (ref 3.8–5.2)
RBC # FLD: 21.7 % — HIGH (ref 10.3–14.5)
RBC # FLD: 21.9 % — HIGH (ref 10.3–14.5)
RBC # FLD: 22.3 % — HIGH (ref 10.3–14.5)
RETICS #: 63 K/UL — SIGNIFICANT CHANGE UP (ref 25–125)
RETICS/RBC NFR: 2.1 % — SIGNIFICANT CHANGE UP (ref 0.5–2.5)
WBC # BLD: 7 K/UL — SIGNIFICANT CHANGE UP (ref 3.8–10.5)
WBC # BLD: 8 K/UL — SIGNIFICANT CHANGE UP (ref 3.8–10.5)
WBC # BLD: 8.7 K/UL — SIGNIFICANT CHANGE UP (ref 3.8–10.5)
WBC # FLD AUTO: 7 K/UL — SIGNIFICANT CHANGE UP (ref 3.8–10.5)
WBC # FLD AUTO: 8 K/UL — SIGNIFICANT CHANGE UP (ref 3.8–10.5)
WBC # FLD AUTO: 8.7 K/UL — SIGNIFICANT CHANGE UP (ref 3.8–10.5)

## 2017-10-26 RX ORDER — ACETAMINOPHEN 500 MG
650 TABLET ORAL EVERY 6 HOURS
Qty: 0 | Refills: 0 | Status: DISCONTINUED | OUTPATIENT
Start: 2017-10-26 | End: 2017-10-27

## 2017-10-26 RX ORDER — PANTOPRAZOLE SODIUM 20 MG/1
40 TABLET, DELAYED RELEASE ORAL
Qty: 0 | Refills: 0 | Status: DISCONTINUED | OUTPATIENT
Start: 2017-10-26 | End: 2017-10-27

## 2017-10-26 RX ORDER — FUROSEMIDE 40 MG
20 TABLET ORAL ONCE
Qty: 0 | Refills: 0 | Status: COMPLETED | OUTPATIENT
Start: 2017-10-26 | End: 2017-10-26

## 2017-10-26 RX ORDER — SIMVASTATIN 20 MG/1
40 TABLET, FILM COATED ORAL AT BEDTIME
Qty: 0 | Refills: 0 | Status: DISCONTINUED | OUTPATIENT
Start: 2017-10-26 | End: 2017-10-27

## 2017-10-26 RX ADMIN — OCTREOTIDE ACETATE 50 MICROGRAM(S): 200 INJECTION, SOLUTION INTRAVENOUS; SUBCUTANEOUS at 00:20

## 2017-10-26 RX ADMIN — PANTOPRAZOLE SODIUM 10 MG/HR: 20 TABLET, DELAYED RELEASE ORAL at 14:19

## 2017-10-26 RX ADMIN — PANTOPRAZOLE SODIUM 40 MILLIGRAM(S): 20 TABLET, DELAYED RELEASE ORAL at 00:52

## 2017-10-26 RX ADMIN — PANTOPRAZOLE SODIUM 10 MG/HR: 20 TABLET, DELAYED RELEASE ORAL at 02:29

## 2017-10-26 RX ADMIN — PANTOPRAZOLE SODIUM 10 MG/HR: 20 TABLET, DELAYED RELEASE ORAL at 01:48

## 2017-10-26 RX ADMIN — PANTOPRAZOLE SODIUM 10 MG/HR: 20 TABLET, DELAYED RELEASE ORAL at 05:08

## 2017-10-26 RX ADMIN — Medication 20 MILLIGRAM(S): at 12:58

## 2017-10-26 RX ADMIN — Medication 650 MILLIGRAM(S): at 14:18

## 2017-10-26 RX ADMIN — SIMVASTATIN 40 MILLIGRAM(S): 20 TABLET, FILM COATED ORAL at 21:15

## 2017-10-26 RX ADMIN — PANTOPRAZOLE SODIUM 40 MILLIGRAM(S): 20 TABLET, DELAYED RELEASE ORAL at 18:41

## 2017-10-26 NOTE — H&P ADULT - ASSESSMENT
Impression: This is a 89-year-old female with acute symptomatic anemia secondary to likely GI blood loss  GI: Suspicious for upper GI bleed  We will continue with PPI GGT  GI evaluation pending in the morning  patient recieved 2 units  Continue to follow CBCs every 6 hours  Orthostatics every shift  Hold all antihypertensive  Keep type and screen active  Patient has 2 IV locks in place at 22 in the left and the 20 in the right  Last upper endoscopy performed in 2016 suggested erosive gastropathy and single erosion in the gastric antrum patient was started on a PPI   Heme: Acute blood loss anemia secondary to GI blood loss  Baseline hemoglobin 9-10 in the past  Transfuse for hemoglobin less than 8   Cardiovascular:  Hold off on spironolactone and simvastatin   Renal: Hyponatremia likely secondary to hypovolemia  We will reassess in the morning but likely secondary to intravascular depletion  After patient has received IV fluids as well as blood products will reassess in the morning.  Hold diuretic  Acute kidney injury likely secondary to prerenal azotemia is patient's baseline creatinine is 1.0 elevated today at 1.42  After completion of blood products will place patient on IV fluids

## 2017-10-26 NOTE — ED ADULT NURSE REASSESSMENT NOTE - COMFORT CARE
plan of care explained/repositioned/darkened lights/side rails up/wait time explained/warm blanket provided

## 2017-10-26 NOTE — CONSULT NOTE ADULT - ASSESSMENT
acute anemia with likely upper GI bleeding status post packed red blood cell.  protonic strip.  Had an extensive discussion with the patient's daughter regarding evaluation. After long discussion, they would like to have an endoscopy.  Also discussed with him that I cannot rule out colonic pathology however, patient started does not want colonoscopy. I think this is reasonable. I discussed with the risk of missed diagnoses including cancer    Mildly confused, possibly secondary toage.  DVT prophylaxis.  endoscopy alternatives benefits and risks reviewed with daughter

## 2017-10-26 NOTE — H&P ADULT - NSHPLABSRESULTS_GEN_ALL_CORE
.  LABS:                         5.5    7.1   )-----------( 325      ( 25 Oct 2017 20:08 )             18.1     10-25    130<L>  |  100  |  26<H>  ----------------------------<  142<H>  4.2   |  21<L>  |  1.42<H>    Ca    8.7      25 Oct 2017 20:08    TPro  6.9  /  Alb  3.4  /  TBili  0.4  /  DBili  x   /  AST  18  /  ALT  26  /  AlkPhos  111  10-25    PT/INR - ( 25 Oct 2017 20:08 )   PT: 12.5 sec;   INR: 1.15 ratio         PTT - ( 25 Oct 2017 20:08 )  PTT:24.4 sec          RADIOLOGY, EKG & ADDITIONAL TESTS: Reviewed.

## 2017-10-26 NOTE — CONSULT NOTE ADULT - SUBJECTIVE AND OBJECTIVE BOX
Patient is a 89y old  Female who presents with a chief complaint of abnormal outpatient labs (26 Oct 2017 02:26)      HPI:  This is a 89-year-old female with a past medical history significant for hypertension, hyperlipidemia, osteoarthritis, history of AVMs as well as upper GI bleed presents from assisted living facility for symptomatic anemia. On admission patient was noted to have a hemoglobin of 5.5. As an outpatient patient's hemoglobin was 7.0. Patient notes that over the last several days patient she is reported weakness fatigue and lethargy. She denies any overt signs of bleeding any melena.  +guaic dark stool   Patient was evaluated by MICU and appropriate for floor   I restart the patient's daughter, Thais discussed with her. History is per patient and daughter. Patient is a poor historian. Daughter Chloe previous history of endoscopies and bleedings. I also reviewed with her that I have reviewed the endoscopy reports previously and she had erosive esophagitis.  Case discussed also with nurse.  Patient did have some dark stools. Patient refused rectal exam.      Past medical history: Aortic dissection, AVM, hypertension, hyperlipidemia, osteoarthritis, DCIS of left breasts  Past surgical history: Cataract  history of hip replacement  Allergies: Oxycodone and also has intolerances gabapentin  Current medications:  Tylenol 500 mg every 6 hours as needed back pain  Allopurinol 100 mg daily  Calcium  Losartan 25 mg daily  Nucynta 25 mg daily  Protonix 40 mg daily  Simvastatin 40 mg daily  Spironolactone 25 mg daily  Carafate 1 g every 6 hours as needed  Vitamin B12, vitamin D3  Social history: No history of tobacco use  History of alcohol use  Patient's medical record came accompanied by her power of  by which Kale Thrasher was designated as her power of  (26 Oct 2017 02:26)      PAST MEDICAL & SURGICAL HISTORY:  Retina disorder  Breast pain, left  Ductal carcinoma in situ (DCIS) of left breast  Esophageal erosions  Osteoarthritis  AVM (arteriovenous malformation)  Renal cyst  Hearing loss, unspecified laterality: uses [employer] B/L  Aortic dissection, abdominal: treated medically and under obeservation as per daughter  Back pain: Comporession fx L4  Carpal tunnel syndrome  Hyperlipidemia  Hypertension  History of hip replacement  H/O aneurysm: renal; s/p repair   Cataract, acquired: surgery does not rmember the date or laterality   delivery delivered: 1950      MEDICATIONS  (STANDING):  pantoprazole  Injectable 40 milliGRAM(s) IV Push two times a day  simvastatin 40 milliGRAM(s) Oral at bedtime    MEDICATIONS  (PRN):  acetaminophen   Tablet. 650 milliGRAM(s) Oral every 6 hours PRN Moderate Pain (4 - 6)      Allergies    oxycodone (Other)    Intolerances    gabapentin (Stomach Upset; Vomiting)      SOCIAL HISTORY:NC, neg drugs    FAMILY HISTORY:  Family history of heart disease (Sibling)  Family history of heart disease (Father)      REVIEW OF SYSTEMS:    CONSTITUTIONAL: No weakness, fevers or chills  EYES/ENT: No visual changes;  No vertigo or throat pain   NECK: No pain or stiffness  RESPIRATORY: No cough, wheezing, hemoptysis; No shortness of breath  CARDIOVASCULAR: No chest pain or palpitations  GENITOURINARY: No dysuria, frequency or hematuria  NEUROLOGICAL: No numbness or weakness  SKIN: No itching, burning, rashes, or lesions   All other review of systems is negative unless indicated above.    Vital Signs Last 24 Hrs  T(C): 36.2 (26 Oct 2017 15:50), Max: 36.8 (25 Oct 2017 22:13)  T(F): 97.2 (26 Oct 2017 15:50), Max: 98.3 (26 Oct 2017 09:03)  HR: 85 (26 Oct 2017 15:50) (75 - 88)  BP: 146/56 (26 Oct 2017 15:50) (101/71 - 168/63)  BP(mean): --  RR: 17 (26 Oct 2017 15:50) (15 - 20)  SpO2: 98% (26 Oct 2017 15:50) (95% - 100%)    PHYSICAL EXAM:    Constitutional: NAD, well-developed  HEENT: EOMI, throat clear  Neck: No LAD, supple  Respiratory: CTA and P  Cardiovascular: S1 and S2, RRR, no M  Gastrointestinal: BS+, soft, epig tend/ND, neg HSM,  Extremities: No peripheral edema, neg clubing, cyanosis  Vascular: 2+ peripheral pulses  Neurological: A/O x 3, no focal deficits  Psychiatric: Normal mood, normal affect  Skin: No rashes    LABS:  CBC Full  -  ( 26 Oct 2017 17:53 )  WBC Count : 8.7 K/uL  Hemoglobin : 8.8 g/dL  Hematocrit : 27.4 %  Platelet Count - Automated : 321 K/uL  Mean Cell Volume : 65.2 fl  Mean Cell Hemoglobin : 20.9 pg  Mean Cell Hemoglobin Concentration : 32.0 gm/dL  Auto Neutrophil # : x  Auto Lymphocyte # : x  Auto Monocyte # : x  Auto Eosinophil # : x  Auto Basophil # : x  Auto Neutrophil % : x  Auto Lymphocyte % : x  Auto Monocyte % : x  Auto Eosinophil % : x  Auto Basophil % : x    10-25    130<L>  |  100  |  26<H>  ----------------------------<  142<H>  4.2   |  21<L>  |  1.42<H>    Ca    8.7      25 Oct 2017 20:08    TPro  6.9  /  Alb  3.4  /  TBili  0.4  /  DBili  x   /  AST  18  /  ALT  26  /  AlkPhos  111  10-25    PT/INR - ( 25 Oct 2017 20:08 )   PT: 12.5 sec;   INR: 1.15 ratio         PTT - ( 25 Oct 2017 20:08 )  PTT:24.4 sec        RADIOLOGY & ADDITIONAL STUDIES:  < from: CT Abdomen and Pelvis w/Cont (. @ 22:31) >  EXAM:  CT ABD AND PELV W CON                            PROCEDURE DATE:  Mar 17 2016       INTERPRETATION:  CLINICAL INFORMATION: History of abdominal aortic   dissection presenting with abdominal pain    COMPARISON: CT lumbar spine performed November 10, 2013..    PROCEDURE:     CT of the Chest, Abdomen and Pelvis was performed with and without   intravenous contrast.   Precontrast imaging was performed through the chest followed by arterial   phase imaging of the chest, abdomen and pelvis in the arterial phase.   Prospective cardiac gating was performed.  Intravenous contrast: 90 ml Omnipaque 350. 10 ml discarded.  Oral contrast:None.  Sagittal and coronal reformats were performed.    FINDINGS:    CHEST:     CHEST WALL AND LOWER NECK: There is a 1.2 x 1.3 cm lesion in the medial   left chest wall with a branch of the left internal thoracic artery   coursing through the lesion.  MEDIASTINUM AND ISHAAN: No lymphadenopathy. The visualized thyroid gland is   unremarkable. The distal esophagus is thickened. Hiatal hernia.    HEART AND VESSELS: The sided aortic arch and left-sided descending   thoracic aorta. There is no intramural hematoma or dissection within the   thoracic aorta.The ascending aorta at the level of the main pulmonary   artery measures 2.9 cm. The descending thoracic aorta measures 2.4 cm at   the level of the main pulmonary artery.     A calcified dissection flap is at the level of L3 immediately and arises   just proximal to the origin of the inferior mesenteric artery. The   dissection flap is presumably fenestrated given that the contrast is   equal in attenuation in the smaller and larger lumen of the dissection.   The dissection extends into the left common iliac and the external iliac   artery.      There is no evidence of aortic aneurysm. The celiac artery is narrowed at   its origin by approximately 50%. The SMA and AVERY are patent.     Arising from the left renal artery approximately 2 cm from its origin and   is a 6 mm peripherally calcified aneurysm. There is subtle thickening of   the wall of the proximal left renal artery.    The right renal artery is patent at its origin, however, there is   nodularity along the wall of the right renal artery (series 14 image 636).    Heart size is normal. No pericardial effusion. There are calcifications   in the left main, left anterior descending, circumflex, and right   coronary arteries. The coronary arteries are patent at their ostia.   Mitral annular and mitral valve calcifications. The mitral valve leaflets   are thickened.     The main pulmonary artery measures 2.4 cm.     LUNGS AND LARGE AIRWAYS: Patent central airways. Bibasilar dependent   atelectasis. There is a 1.6 cm subpleural lung cyst in the posterior   aspect of the left lower lobe. There is a calcified nodule in the right   lower lobe measuring 3 mm (series 14, image 366).    PLEURA: Trace left pleural effusion.    ABDOMEN AND PELVIS:    LIVER: There is an 8 mm hyperattenuating lesion in the periphery of the   right hepatic lobe that likely represents a hemangioma (series 14 image   502). The remainder of the liver is normal.  BILE DUCTS: Normal caliber.  GALLBLADDER: Cholelithiasis and mild calcification of the anterior wall   of the gallbladder. Gallbladder is distended  SPLEEN: Unremarkable allowing for the early arterial timing.  PANCREAS: There are 2 hypodense pancreatic lesions that measure:  *  Arising from the tail of the pancreas is a hypodense lesion measuring   12 x 15 mm (series 14 image 556).  *  Arising from the distal body of the pancreas is a hypodense 2.8 x 2.6   cm lesion (series 14 image 624).   *  In the uncinate process of the pancreas is a hypodense 11 x 7 mm   lesion (series 14 image 684).  The remainder the pancreas is unremarkable. No intrapancreatic ductal   dilatation. It is uncertain if these lesions can indicate with the   pancreatic duct.  ADRENALS: The right adrenal gland is unremarkable. Nodular thickening of   the left adrenal gland.  KIDNEYS/URETERS: There is a 7.8 cm right lower pole renal cyst.   Nonobstructing right lower pole 2 mm calculus. The right kidney is   otherwise unremarkable.    Status post partial left nephrectomy. There is an complex 5.6 x 4.8 cm   left upper pole lesion which does not satisfy the criteria for a simple   cyst (30 Hounsfield units). There may be subtle enhancement of the lesion   between 6:00 and 12:00.    BLADDER: Incompletely evaluated secondary to beam hardening artifact from   the right hip prosthesis, however the visualized segments of the bladder   are unremarkable.  REPRODUCTIVE ORGANS:  Uterus is incompletely evaluated secondary to beam   hardening artifact from the right hip prosthesis, however, the visualized   segments of the uterus are unremarkable. The adnexa are not well  visualized.    BOWEL: The stomach is incompletely distended. The small bowel is normal   in caliber. The distal sigmoid colon is focally dilated. No bowel   obstruction. Diverticulosis. Appendix within normal limits.  PERITONEUM: No ascites.  RETROPERITONEUM: No lymphadenopathy.    ABDOMINAL WALL: Within normal limits.  BONES: Status post right total hip arthroplasty with 2 femoral   augmentation screws. There is a severe compression deformity of the L4   vertebral body status post kyphoplasty.There is thoracic spine   spondylosis, most severe at the L2-L3 and L3-L4 levels.    IMPRESSION: Chest:  1.  No aneurysm, acute intramural hematoma, or dissection of the thoracic   aorta.  2.  Thickening of the mitral valvular calcifications of the mitral valve   can indicate underlying mitral valve disease. Echocardiography is   suggested if it has not been performed recently.  3.  Thickening of the distal esophagus can occur in the clinical setting   of an esophagitis.  4.  Soft tissue lesion in the left chest wall subcutaneous tissues is of   uncertain origin.  5.  Likely underlying mitral valve disease. Echocardiography is suggested   if it has not been performed recently.    Abdomen and pelvis:  1.  No change in the suspected chronic, infrarenal abdominal aortic   dissection at the level of L3 immediately from just proximal to the   inferior mesenteric artery into the left common iliac and the external   iliac arteries since 11/10/2013.  2.  No aortic aneurysm.  3.  6 mm left renal artery aneurysm.  4.  Nodular thickening and irregularity of the wall of the proximal right   renal artery. It is uncertain if this is secondary to atheromatous   disease or an inflammatory arteritis.  5.  Ostial celiac artery stenosis.  6.  Complex left renal mass does not satisfy the criteria for a simple   cyst. It may represent either a complex cyst or alternatively a renal   malignancy. Ultrasound or MR abdomen may be helpful for characterization.  7.  Hypodense lesions arising from the tail of the pancreas and uncinate   process of the pancreas. These may represent cystic pancreatic lesions or   alternatively pseudocysts.         ALFREDO ADKINS M.D., RADIOLOGY RESIDENT  This document has been electronically signed.  DI ALEXANDER M.D., Attending Radiologist  This document has been electronically signed. Mar 18 2016  1:11P                    < end of copied text >

## 2017-10-26 NOTE — PROGRESS NOTE ADULT - ASSESSMENT
89-year-old female with a past medical history significant for hypertension, hyperlipidemia, osteoarthritis, history of AVMs as well as upper GI bleed presents from assisted living facility for symptomatic anemia.     #Symptomatic Anemia with suspicion for GIB  - s/p 2 RPBCs  - Trend H/H  - Continue PPI Prophylaxis  - GI Consult pending  - Endoscopy (2016) - Erosive Gastropathy  - Will likely need Endoscopy/Colonoscopy evaluation  - Hold BP meds for now    #HLD  - Continue Statin    #HTN  - Hold meds for now  - Re-start if BP remains elevated

## 2017-10-26 NOTE — H&P ADULT - NSHPREVIEWOFSYSTEMS_GEN_ALL_CORE
REVIEW OF SYSTEMS:    CONSTITUTIONAL: + weakness  EYES/ENT: No visual changes;  No vertigo or throat pain   NECK: No pain or stiffness  RESPIRATORY: No cough, wheezing, hemoptysis; No shortness of breath  CARDIOVASCULAR: No chest pain or palpitations  GASTROINTESTINAL: No abdominal or epigastric pain. No nausea, vomiting, or hematemesis; No diarrhea or constipation. No melena or hematochezia.  GENITOURINARY: No dysuria, frequency or hematuria  NEUROLOGICAL: No numbness or weakness  SKIN: No itching, burning, rashes, or lesions   All other review of systems is negative unless indicated above.

## 2017-10-26 NOTE — ED ADULT NURSE REASSESSMENT NOTE - NS ED NURSE REASSESS COMMENT FT1
Rounded on patient  repositioned patient  toileted patient
pt and son (HCP) made aware of low h/h, MD jose at bedside to discuss blood transfusion x 2. pt and son agree with plan of care, consent signed.
pt assisted to bathroom
pt assisted with bedpain, second transfusion continues, pt without complaints
pt resting comfortably protonix gtt infusing pending admission to [facility] no complaints
Patient A&Ox2-3, forgetful & confused at times.  SOB at rest at times per patient, PLATT; SpO2 mid-high 90s with supplemental oxygen. Pale, generalized weakness; VSS. Denies pain/discomfort at this time. No s/s of bleeding present at this time, no BM on my time. Protonix gtt infusing as prescribed. Safety & comfort measures in place. Will continue to monitor.

## 2017-10-26 NOTE — H&P ADULT - NSHPPHYSICALEXAM_GEN_ALL_CORE
GENERAL APPEARANCE:  in no acute distress.  VITAL SIGNS: Temperature 98 heart rate 78 /60 respirations 18 O2 sat 99% room air  SKIN: skin palor  HEENT: Dry mucous membranes.  NECK: Supple and symmetric.  CHEST: Normal AP diameter and normal contour without any kyphoscoliosis.  LUNGS: Auscultation of the lungs revealed normal breath sounds without any other adventitious sounds or rubs.  CARDIOVASCULAR: There was a regular rate and rhythm without any murmurs, gallops, rubs.   ABDOMEN: Soft and nontender with normal bowel sounds.   MUSCULOSKELETAL:  Muscle strength and tone were normal.  EXTREMITIES: No cyanosis, clubbing or edema.  NEUROLOGIC: Alert and oriented x 2. Normal affect.

## 2017-10-26 NOTE — PATIENT PROFILE ADULT. - NS TRANSFER PATIENT BELONGINGS
Clothing 2 yellow rings, black pocket book/Wrist Watch/Other belongings/Jewelry/Money (specify)/Clothing

## 2017-10-26 NOTE — H&P ADULT - HISTORY OF PRESENT ILLNESS
This is a 89-year-old female with a past medical history significant for hypertension, hyperlipidemia, osteoarthritis, history of AVMs as well as upper GI bleed presents from assisted living facility for symptomatic anemia. On admission patient was noted to have a hemoglobin of 5.5. As an outpatient patient's hemoglobin was 7.0. Patient notes that over the last several days patient she is reported weakness fatigue and lethargy. She denies any overt signs of bleeding any melena.  Past medical history: Aortic dissection, AVM, hypertension, hyperlipidemia, osteoarthritis, DCIS of left breasts  Past surgical history: Cataract  history of hip replacement  Allergies: Oxycodone and also has intolerances gabapentin  Current medications:  Tylenol 500 mg every 6 hours as needed back pain  Allopurinol 100 mg daily  Calcium  Losartan 25 mg daily  Nucynta 25 mg daily  Protonix 40 mg daily  Simvastatin 40 mg daily  Spironolactone 25 mg daily  Carafate 1 g every 6 hours as needed  Vitamin B12, vitamin D3  Social history: No history of tobacco use  History of alcohol use  Patient's medical record came accompanied by her power of  by which Kale Thrasher was designated as her power of  This is a 89-year-old female with a past medical history significant for hypertension, hyperlipidemia, osteoarthritis, history of AVMs as well as upper GI bleed presents from assisted living facility for symptomatic anemia. On admission patient was noted to have a hemoglobin of 5.5. As an outpatient patient's hemoglobin was 7.0. Patient notes that over the last several days patient she is reported weakness fatigue and lethargy. She denies any overt signs of bleeding any melena.  Patient was evaluated by MICU and appropriate for floor   Past medical history: Aortic dissection, AVM, hypertension, hyperlipidemia, osteoarthritis, DCIS of left breasts  Past surgical history: Cataract  history of hip replacement  Allergies: Oxycodone and also has intolerances gabapentin  Current medications:  Tylenol 500 mg every 6 hours as needed back pain  Allopurinol 100 mg daily  Calcium  Losartan 25 mg daily  Nucynta 25 mg daily  Protonix 40 mg daily  Simvastatin 40 mg daily  Spironolactone 25 mg daily  Carafate 1 g every 6 hours as needed  Vitamin B12, vitamin D3  Social history: No history of tobacco use  History of alcohol use  Patient's medical record came accompanied by her power of  by which Kale Thrasher was designated as her power of  This is a 89-year-old female with a past medical history significant for hypertension, hyperlipidemia, osteoarthritis, history of AVMs as well as upper GI bleed presents from assisted living facility for symptomatic anemia. On admission patient was noted to have a hemoglobin of 5.5. As an outpatient patient's hemoglobin was 7.0. Patient notes that over the last several days patient she is reported weakness fatigue and lethargy. She denies any overt signs of bleeding any melena.  +guaic dark stool   Patient was evaluated by MICU and appropriate for floor   Past medical history: Aortic dissection, AVM, hypertension, hyperlipidemia, osteoarthritis, DCIS of left breasts  Past surgical history: Cataract  history of hip replacement  Allergies: Oxycodone and also has intolerances gabapentin  Current medications:  Tylenol 500 mg every 6 hours as needed back pain  Allopurinol 100 mg daily  Calcium  Losartan 25 mg daily  Nucynta 25 mg daily  Protonix 40 mg daily  Simvastatin 40 mg daily  Spironolactone 25 mg daily  Carafate 1 g every 6 hours as needed  Vitamin B12, vitamin D3  Social history: No history of tobacco use  History of alcohol use  Patient's medical record came accompanied by her power of  by which Kale Thrasher was designated as her power of

## 2017-10-26 NOTE — H&P ADULT - FAMILY HISTORY
Father  Still living? No  Family history of heart disease, Age at diagnosis: Age Unknown     Sibling  Still living? No  Family history of heart disease, Age at diagnosis: Age Unknown

## 2017-10-27 ENCOUNTER — TRANSCRIPTION ENCOUNTER (OUTPATIENT)
Age: 82
End: 2017-10-27

## 2017-10-27 ENCOUNTER — RESULT REVIEW (OUTPATIENT)
Age: 82
End: 2017-10-27

## 2017-10-27 VITALS
DIASTOLIC BLOOD PRESSURE: 60 MMHG | RESPIRATION RATE: 18 BRPM | OXYGEN SATURATION: 98 % | SYSTOLIC BLOOD PRESSURE: 128 MMHG | TEMPERATURE: 97 F | HEART RATE: 80 BPM

## 2017-10-27 LAB
ADD ON TEST-SPECIMEN IN LAB: SIGNIFICANT CHANGE UP
ANION GAP SERPL CALC-SCNC: 10 MMOL/L — SIGNIFICANT CHANGE UP (ref 5–17)
BUN SERPL-MCNC: 19 MG/DL — SIGNIFICANT CHANGE UP (ref 7–23)
CALCIUM SERPL-MCNC: 8.9 MG/DL — SIGNIFICANT CHANGE UP (ref 8.5–10.1)
CHLORIDE SERPL-SCNC: 102 MMOL/L — SIGNIFICANT CHANGE UP (ref 96–108)
CO2 SERPL-SCNC: 23 MMOL/L — SIGNIFICANT CHANGE UP (ref 22–31)
CREAT SERPL-MCNC: 1.18 MG/DL — SIGNIFICANT CHANGE UP (ref 0.5–1.3)
GLUCOSE SERPL-MCNC: 115 MG/DL — HIGH (ref 70–99)
HCT VFR BLD CALC: 27.1 % — LOW (ref 34.5–45)
HGB BLD-MCNC: 8.7 G/DL — LOW (ref 11.5–15.5)
MCHC RBC-ENTMCNC: 21.2 PG — LOW (ref 27–34)
MCHC RBC-ENTMCNC: 32.2 GM/DL — SIGNIFICANT CHANGE UP (ref 32–36)
MCV RBC AUTO: 65.7 FL — LOW (ref 80–100)
PLATELET # BLD AUTO: 317 K/UL — SIGNIFICANT CHANGE UP (ref 150–400)
POTASSIUM SERPL-MCNC: 3.4 MMOL/L — LOW (ref 3.5–5.3)
POTASSIUM SERPL-SCNC: 3.4 MMOL/L — LOW (ref 3.5–5.3)
RBC # BLD: 4.13 M/UL — SIGNIFICANT CHANGE UP (ref 3.8–5.2)
RBC # FLD: 22.6 % — HIGH (ref 10.3–14.5)
SODIUM SERPL-SCNC: 135 MMOL/L — SIGNIFICANT CHANGE UP (ref 135–145)
WBC # BLD: 7.8 K/UL — SIGNIFICANT CHANGE UP (ref 3.8–10.5)
WBC # FLD AUTO: 7.8 K/UL — SIGNIFICANT CHANGE UP (ref 3.8–10.5)

## 2017-10-27 PROCEDURE — 88313 SPECIAL STAINS GROUP 2: CPT | Mod: 26

## 2017-10-27 PROCEDURE — 93306 TTE W/DOPPLER COMPLETE: CPT | Mod: 26

## 2017-10-27 PROCEDURE — 88305 TISSUE EXAM BY PATHOLOGIST: CPT | Mod: 26

## 2017-10-27 RX ORDER — ACETAMINOPHEN 500 MG
2 TABLET ORAL
Qty: 0 | Refills: 0 | COMMUNITY

## 2017-10-27 RX ORDER — TRAMADOL HYDROCHLORIDE 50 MG/1
0 TABLET ORAL
Qty: 0 | Refills: 0 | COMMUNITY

## 2017-10-27 RX ORDER — POTASSIUM CHLORIDE 20 MEQ
40 PACKET (EA) ORAL ONCE
Qty: 0 | Refills: 0 | Status: COMPLETED | OUTPATIENT
Start: 2017-10-27 | End: 2017-10-27

## 2017-10-27 RX ADMIN — PANTOPRAZOLE SODIUM 40 MILLIGRAM(S): 20 TABLET, DELAYED RELEASE ORAL at 17:12

## 2017-10-27 RX ADMIN — Medication 40 MILLIEQUIVALENT(S): at 17:11

## 2017-10-27 RX ADMIN — PANTOPRAZOLE SODIUM 40 MILLIGRAM(S): 20 TABLET, DELAYED RELEASE ORAL at 05:08

## 2017-10-27 NOTE — DISCHARGE NOTE ADULT - CARE PLAN
Principal Discharge DX:	Gastrointestinal hemorrhage, unspecified gastrointestinal hemorrhage type  Goal:	Stable patient  Instructions for follow-up, activity and diet:	Hemoglobin 8.7 on discharge, stable. S/p 2 units of blood during hospitalization. Endoscopy showed esophagitis which may be source of GI bleed. Patient and family declined colonoscopy and they understand the risks of missed diagnosis including bleeding source, cancer. Pureed diet recommended. Continue PPI, avoid NSAIDs, follow-up with primary care physician and gastroenterology.  Secondary Diagnosis:	Back pain, unspecified back location, unspecified back pain laterality, unspecified chronicity  Goal:	Stable patient  Instructions for follow-up, activity and diet:	Continue tylenol and tramadol for pain. Follow-up with pain management routinely. Avoid NSAIDs.  Secondary Diagnosis:	Hyperlipidemia, unspecified hyperlipidemia type  Goal:	Stable patient  Instructions for follow-up, activity and diet:	Follow-up with PCP. Continue statin.  Secondary Diagnosis:	Hypertension, unspecified type  Goal:	Stable patient  Instructions for follow-up, activity and diet:	Continue spironolactone and losartan. Amlodipine held for now, follow-up with PCP to consider restarting this medication as indicated.  Secondary Diagnosis:	Fluid overload due to blood transfusion  Goal:	Stable patient  Instructions for follow-up, activity and diet:	Follow-up with PCP for echocardiogram.

## 2017-10-27 NOTE — PROGRESS NOTE ADULT - SUBJECTIVE AND OBJECTIVE BOX
Pt has been seen and examined with FP resident, resident supervised agree with a/p       Patient is a 89y old  Female who presents with a chief complaint of abnormal outpatient labs (26 Oct 2017 02:26)        HPI:  This is a 89-year-old female with a past medical history significant for hypertension, hyperlipidemia, osteoarthritis, history of AVMs as well as upper GI bleed presents from assisted living facility for symptomatic anemia.        Past medical history: Aortic dissection, AVM, hypertension, hyperlipidemia, osteoarthritis, DCIS of left breasts        PHYSICAL EXAM:  Vital Signs Last 24 Hrs  T(C): 36.8 (26 Oct 2017 09:03), Max: 36.8 (25 Oct 2017 22:13)  T(F): 98.3 (26 Oct 2017 09:03), Max: 98.3 (26 Oct 2017 09:03)  HR: 84 (26 Oct 2017 09:03) (75 - 88)  BP: 142/64 (26 Oct 2017 09:03) (101/71 - 168/63)  BP(mean): --  RR: 18 (26 Oct 2017 09:03) (15 - 20)  SpO2: 96% (26 Oct 2017 09:03) (95% - 100%)  general- comfortable but appears confuse  -rs-aeeb,cta  -cvs-s1s2 normal   -p/a-soft,bs+  -extremity- no asymmetrical swelling noted   -cns- non focal         A/P    #Acute anemia due to possible upper gi bleed   -s/p prbc infusion, monitor it closely, gi evaluation    #Clinically appears in heart failure state  -give lasix iv, echo and monitor her clinically     #confusion- ? may be baseline, monitor her     #dvt pr with scd for now
HPI  89-year-old female with a past medical history significant for hypertension, hyperlipidemia, osteoarthritis, history of AVMs as well as upper GI bleed presents from assisted living facility for symptomatic anemia.     (10/26/17) Patient seen and examined at bedside. Confused as to why and how she arrived at hospital. States that she has felt more fatigue and lethargic prior to hospitalization. Will obtain further details from family members.    REVIEW OF SYSTEMS:  CONSTITUTIONAL: No weakness, fevers or chills, +Fatigue, +Lethargy  EYES/ENT: No visual changes;  No vertigo or throat pain   NECK: No pain or stiffness  RESPIRATORY: No cough, wheezing, hemoptysis; No shortness of breath  CARDIOVASCULAR: No chest pain or palpitations  GASTROINTESTINAL: No abdominal or epigastric pain. No nausea, vomiting, or hematemesis; No diarrhea or constipation. No melena or hematochezia.  GENITOURINARY: No dysuria, frequency or hematuria  NEUROLOGICAL: No numbness or weakness  SKIN: No itching, burning, rashes, or lesions     VITALS  T(C): 36.8 (10-26-17 @ 09:03), Max: 36.8 (10-25-17 @ 22:13)  HR: 84 (10-26-17 @ 09:03) (75 - 88)  BP: 142/64 (10-26-17 @ 09:03) (101/71 - 168/63)  RR: 18 (10-26-17 @ 09:03) (15 - 20)  SpO2: 96% (10-26-17 @ 09:03) (95% - 100%)  Wt(kg): --    PHYSICAL EXAM:  GENERAL: NAD, confused  HEAD:  NC/AT  EYES: EOMI, PERRL, no scleral icterus  HEENT: Moist mucous membranes  NECK: Supple, + JVD  CNS:  Alert & Oriented X1,  LUNG: +Mild Rales  HEART: RRR; No murmurs, rubs, or gallops  ABDOMEN: +BS, ST/ND/NT  GENITOURINARY- Voiding, Bladder not distended  EXTREMITIES:  2+ Peripheral Pulses, No clubbing, cyanosis, or edema  MUSCULOSKELTAL- Joints normal ROM, no Muscle or joint tenderness    LABS  CARDIAC MARKERS ( 25 Oct 2017 20:08 )  0.032 ng/mL / x     / x     / x     / x                   7.8    8.0   )-----------( 284      ( 26 Oct 2017 12:08 )             24.6     25 Oct 2017 20:08    130    |  100    |  26     ----------------------------<  142    4.2     |  21     |  1.42     Ca    8.7        25 Oct 2017 20:08    TPro  6.9    /  Alb  3.4    /  TBili  0.4    /  DBili  x      /  AST  18     /  ALT  26     /  AlkPhos  111    25 Oct 2017 20:08    PT/INR - ( 25 Oct 2017 20:08 )   PT: 12.5 sec;   INR: 1.15 ratio        PTT - ( 25 Oct 2017 20:08 )  PTT:24.4 sec  CAPILLARY BLOOD GLUCOSE    LIVER FUNCTIONS - ( 25 Oct 2017 20:08 )  Alb: 3.4 g/dL / Pro: 6.9 gm/dL / ALK PHOS: 111 U/L / ALT: 26 U/L / AST: 18 U/L / GGT: x           MEDICATIONS  (STANDING):  pantoprazole Infusion 8 mG/Hr (10 mL/Hr) IV Continuous <Continuous>  simvastatin 40 milliGRAM(s) Oral at bedtime    MEDICATIONS  (PRN):  acetaminophen   Tablet. 650 milliGRAM(s) Oral every 6 hours PRN Moderate Pain (4 - 6)
Pt has been seen and examined with FP resident, resident supervised agree with a/p       Patient is a 89y old  Female who presents with a chief complaint of abnormal outpatient labs (26 Oct 2017 02:26)        HPI:  This is a 89-year-old female with a past medical history significant for hypertension, hyperlipidemia, osteoarthritis, history of AVMs as well as upper GI bleed presents from assisted living facility for symptomatic anemia.        Past medical history: Aortic dissection, AVM, hypertension, hyperlipidemia, osteoarthritis, DCIS of left breasts        PHYSICAL EXAM:    general- comfortable but appears confuse  -rs-aeeb,cta  -cvs-s1s2 normal   -p/a-soft,bs+  -extremity- no asymmetrical swelling noted   -cns- non focal         A/P    #Acute blood loss anemia due to esophagitis/gastric ulcer   -s/p prbc infusion, stable h/h now   -d/c today on po ppi with further management as an outpt     #esophageal stricture- d/c on strictly  puree diet only with further management as an outpt in GI office     #Clinically yesterday was in heart failure state or fluid overload state due to prbc infusion - no EF available, not clear type of heart failure  -now no evidence of any fluid overload or heart failure   -can get echo as an outpt and cardiology evaluation as an outpt   -no need for standing lasix based on clinical situation   -d/c today with further management as an outpt       #confusion- baseline, possible early dementia-discussed with son at bedside today and as per him has memory impairment    #d/c today     #time spent more than 30 minutes

## 2017-10-27 NOTE — DISCHARGE NOTE ADULT - SECONDARY DIAGNOSIS.
Back pain, unspecified back location, unspecified back pain laterality, unspecified chronicity Hyperlipidemia, unspecified hyperlipidemia type Hypertension, unspecified type Fluid overload due to blood transfusion

## 2017-10-27 NOTE — DISCHARGE NOTE ADULT - PATIENT PORTAL LINK FT
“You can access the [employer] Patient Portal, offered by [facility], by registering with the following website: [email or web address]”

## 2017-10-27 NOTE — DISCHARGE NOTE ADULT - MEDICATION SUMMARY - MEDICATIONS TO STOP TAKING
I will STOP taking the medications listed below when I get home from the hospital:    amLODIPine 10 mg oral tablet  -- 1 tab(s) by mouth once a day    amoxicillin 500 mg oral tablet  -- 1 tab(s) by mouth 3 times a day   -- Finish all this medication unless otherwise directed by prescriber.

## 2017-10-27 NOTE — CDI QUERY NOTE - NSCDICHFTXTBX_GEN_ALL_CORE_HH
1) Per documentation : #Clinically appears in heart failure state  -give lasix iv, echo and monitor her clinically ( lasix 20mg IV given x 1)  CXR= mild vascular congestion.    When known please clarify the type and the acuity of the heart failure.  ie... Acute on chronic CHF ? Chronic CHF ? Acute CHF ? No evidence of CHF ?  ...... Diastolic CHF ? Systolic CHF ? Other ? Please clarify

## 2017-10-27 NOTE — DISCHARGE NOTE ADULT - PLAN OF CARE
Stable patient Follow-up with PCP for echocardiogram. Hemoglobin 8.7 on discharge, stable. S/p 2 units of blood during hospitalization. Endoscopy showed esophagitis which may be source of GI bleed. Patient and family declined colonoscopy and they understand the risks of missed diagnosis including bleeding source, cancer. Pureed diet recommended. Continue PPI, avoid NSAIDs, follow-up with primary care physician and gastroenterology. Continue tylenol and tramadol for pain. Follow-up with pain management routinely. Avoid NSAIDs. Follow-up with PCP. Continue statin. Continue spironolactone and losartan. Amlodipine held for now, follow-up with PCP to consider restarting this medication as indicated.

## 2017-10-27 NOTE — DISCHARGE NOTE ADULT - HOSPITAL COURSE
10/27: Patient seen and examined at the bedside. Feeling well, without acute complaints. Care discussed in depth with patient's son.     PE  General: Comfortable, no acute distress  CV: S1/S2, RRR  Resp: CTA b/l  Abd: SOft, non-tender  Peripheral: Pulses present, no edema  Neuro: Non-focal      89-year-old female with a past medical history significant for hypertension, hyperlipidemia, osteoarthritis, history of AVMs as well as upper GI bleed presents from assisted living facility for symptomatic anemia.     #Symptomatic Anemia with suspicion for GIB  - s/p 2 RPBCs  - H/H Stabe  - Continue PPI Prophylaxis  - GI Consult appreciated.   - Endoscopy noted  -Patient and family elect against colonoscopy, understanding all risks and benefits.  - Restart ARB and spironolactone hold amlodipine on d/c until evaluated by PCP within 1 week.    #HLD  - Continue Statin    #HTN  -As above

## 2017-10-27 NOTE — CDI QUERY NOTE - NSCDIOTHERTXTBX_GEN_ALL_CORE_HH
2) Patient admitted with symptomatic anemia ( Acute blood loss anemia documented). H/H= 5.5/ 18.1, transfused with 2 u pc.  EGD done on 10/27 and revealed GERD with esophagitis ,and  gastric ulcer.    When known please clarify the underlying etiology of the symptomatic anemia.  ie.. ABLA due to GIB, unspecified ?  .....  ABLA due to GERD and esophagitis ?  ..... ABLA due to gastric ulcer ?  ..... Other etiology ? Please clarify  ie...

## 2017-10-27 NOTE — DISCHARGE NOTE ADULT - CARE PROVIDER_API CALL
Scott Blanton), Internal Medicine  [address] [place] [id number]  Phone: [phone]  Fax: [phone]    Elliott Andrews), Gastroenterology  [address] [place] [address]  Phone: [phone]  Fax: [phone]

## 2017-10-27 NOTE — DISCHARGE NOTE ADULT - MEDICATION SUMMARY - MEDICATIONS TO TAKE
I will START or STAY ON the medications listed below when I get home from the hospital:    spironolactone 25 mg oral tablet  -- 1 tab(s) by mouth once a day - home/hospital  -- Indication: For Hypertension    traMADol 50 mg oral tablet  -- 1 tab(s) by mouth every 8 hours, As Needed -for moderate pain MDD:150 mg  -- Caution federal law prohibits the transfer of this drug to any person other  than the person for whom it was prescribed.  May cause drowsiness.  Alcohol may intensify this effect.  Use care when operating dangerous machinery.  Obtain medical advice before taking any non-prescription drugs as some may affect the action of this medication.    -- Indication: For Chronic pain as needed    acetaminophen 325 mg oral tablet  -- 2 tab(s) by mouth every 6 hours, As Needed  -- Indication: For Pain as needed    losartan 25 mg oral tablet  -- 1 tab(s) by mouth once a day  -- Indication: For Hypertension    aluminum hydroxide-magnesium hydroxide 200 mg-200 mg/5 mL oral suspension  -- 30 milliliter(s) by mouth every 6 hours, As Needed  -- Indication: For GI Upset    allopurinol 100 mg oral tablet  --  by mouth once a day  -- Indication: For Gout maintenance    simvastatin 20 mg oral tablet  -- 2 tab(s) by mouth once a day (at bedtime) - Home/Hospital  -- Indication: For Hyperlipidemia    sucralfate 1 g oral tablet  -- 1 tab(s) by mouth 4 times a day  -- Indication: For GI Upset    pantoprazole 40 mg oral delayed release tablet  -- 1 tab(s) by mouth 2 times a day (before meals)  -- Indication: For GI Upset, prevent bleed    Vitamin B12  -- 1000 microgram(s) under tongue once a day  -- Indication: For Supplement    Vitamin D3 1000 intl units oral capsule  -- 2  by mouth once a day  -- Indication: For Supplement

## 2017-10-31 LAB — SURGICAL PATHOLOGY FINAL REPORT - CH: SIGNIFICANT CHANGE UP

## 2017-11-07 DIAGNOSIS — H91.8X3 OTHER SPECIFIED HEARING LOSS, BILATERAL: ICD-10-CM

## 2017-11-07 DIAGNOSIS — Z96.649 PRESENCE OF UNSPECIFIED ARTIFICIAL HIP JOINT: ICD-10-CM

## 2017-11-07 DIAGNOSIS — Z85.3 PERSONAL HISTORY OF MALIGNANT NEOPLASM OF BREAST: ICD-10-CM

## 2017-11-07 DIAGNOSIS — D62 ACUTE POSTHEMORRHAGIC ANEMIA: ICD-10-CM

## 2017-11-07 DIAGNOSIS — K21.0 GASTRO-ESOPHAGEAL REFLUX DISEASE WITH ESOPHAGITIS: ICD-10-CM

## 2017-11-07 DIAGNOSIS — F03.90 UNSPECIFIED DEMENTIA WITHOUT BEHAVIORAL DISTURBANCE: ICD-10-CM

## 2017-11-07 DIAGNOSIS — E87.1 HYPO-OSMOLALITY AND HYPONATREMIA: ICD-10-CM

## 2017-11-07 DIAGNOSIS — K22.2 ESOPHAGEAL OBSTRUCTION: ICD-10-CM

## 2017-11-07 DIAGNOSIS — G89.29 OTHER CHRONIC PAIN: ICD-10-CM

## 2017-11-07 DIAGNOSIS — K92.2 GASTROINTESTINAL HEMORRHAGE, UNSPECIFIED: ICD-10-CM

## 2017-11-07 DIAGNOSIS — I50.9 HEART FAILURE, UNSPECIFIED: ICD-10-CM

## 2017-11-07 DIAGNOSIS — K44.9 DIAPHRAGMATIC HERNIA WITHOUT OBSTRUCTION OR GANGRENE: ICD-10-CM

## 2017-11-07 DIAGNOSIS — I11.0 HYPERTENSIVE HEART DISEASE WITH HEART FAILURE: ICD-10-CM

## 2017-11-07 DIAGNOSIS — M19.90 UNSPECIFIED OSTEOARTHRITIS, UNSPECIFIED SITE: ICD-10-CM

## 2017-11-07 DIAGNOSIS — E78.5 HYPERLIPIDEMIA, UNSPECIFIED: ICD-10-CM

## 2017-11-07 DIAGNOSIS — K25.4 CHRONIC OR UNSPECIFIED GASTRIC ULCER WITH HEMORRHAGE: ICD-10-CM

## 2017-11-16 ENCOUNTER — APPOINTMENT (OUTPATIENT)
Dept: BREAST CENTER | Facility: CLINIC | Age: 82
End: 2017-11-16
Payer: MEDICARE

## 2017-11-16 VITALS
DIASTOLIC BLOOD PRESSURE: 80 MMHG | BODY MASS INDEX: 20.09 KG/M2 | WEIGHT: 125 LBS | HEIGHT: 66 IN | HEART RATE: 74 BPM | SYSTOLIC BLOOD PRESSURE: 126 MMHG

## 2017-11-16 DIAGNOSIS — Z85.3 PERSONAL HISTORY OF MALIGNANT NEOPLASM OF BREAST: ICD-10-CM

## 2017-11-16 DIAGNOSIS — N63.20 UNSPECIFIED LUMP IN THE LEFT BREAST, UNSPECIFIED QUADRANT: ICD-10-CM

## 2017-11-16 PROCEDURE — 99214 OFFICE O/P EST MOD 30 MIN: CPT

## 2017-11-16 RX ORDER — ACETAMINOPHEN 500 MG/1
500 TABLET ORAL
Refills: 0 | Status: ACTIVE | COMMUNITY

## 2017-11-16 RX ORDER — ANTACID TABLETS 500 MG/1
500 TABLET, CHEWABLE ORAL
Refills: 0 | Status: ACTIVE | COMMUNITY

## 2017-11-16 RX ORDER — TAPENTADOL HYDROCHLORIDE 50 MG/1
50 TABLET, FILM COATED ORAL
Refills: 0 | Status: ACTIVE | COMMUNITY

## 2017-11-16 RX ORDER — TRAMADOL HYDROCHLORIDE 50 MG/1
50 TABLET, COATED ORAL
Refills: 0 | Status: ACTIVE | COMMUNITY

## 2017-11-22 ENCOUNTER — RX RENEWAL (OUTPATIENT)
Age: 82
End: 2017-11-22

## 2018-02-27 ENCOUNTER — APPOINTMENT (OUTPATIENT)
Dept: BREAST CENTER | Facility: CLINIC | Age: 83
End: 2018-02-27
Payer: MEDICARE

## 2018-02-27 VITALS
WEIGHT: 125 LBS | SYSTOLIC BLOOD PRESSURE: 138 MMHG | BODY MASS INDEX: 20.09 KG/M2 | HEIGHT: 66 IN | DIASTOLIC BLOOD PRESSURE: 84 MMHG | HEART RATE: 66 BPM

## 2018-02-27 PROCEDURE — 10021 FNA BX W/O IMG GDN 1ST LES: CPT

## 2018-02-27 PROCEDURE — 99214 OFFICE O/P EST MOD 30 MIN: CPT | Mod: 25

## 2018-02-27 RX ORDER — FERROUS SULFATE 325(65) MG
325 (65 FE) TABLET ORAL
Refills: 0 | Status: ACTIVE | COMMUNITY

## 2018-04-20 ENCOUNTER — EMERGENCY (EMERGENCY)
Facility: HOSPITAL | Age: 83
LOS: 0 days | Discharge: HOME | End: 2018-04-20
Attending: EMERGENCY MEDICINE | Admitting: EMERGENCY MEDICINE
Payer: MEDICARE

## 2018-04-20 VITALS
OXYGEN SATURATION: 96 % | HEART RATE: 81 BPM | SYSTOLIC BLOOD PRESSURE: 150 MMHG | WEIGHT: 149.91 LBS | TEMPERATURE: 98 F | RESPIRATION RATE: 17 BRPM | DIASTOLIC BLOOD PRESSURE: 78 MMHG

## 2018-04-20 VITALS
SYSTOLIC BLOOD PRESSURE: 132 MMHG | OXYGEN SATURATION: 95 % | TEMPERATURE: 98 F | RESPIRATION RATE: 18 BRPM | DIASTOLIC BLOOD PRESSURE: 69 MMHG | HEART RATE: 75 BPM

## 2018-04-20 DIAGNOSIS — R07.9 CHEST PAIN, UNSPECIFIED: ICD-10-CM

## 2018-04-20 DIAGNOSIS — E78.5 HYPERLIPIDEMIA, UNSPECIFIED: ICD-10-CM

## 2018-04-20 DIAGNOSIS — K30 FUNCTIONAL DYSPEPSIA: ICD-10-CM

## 2018-04-20 DIAGNOSIS — Z96.649 PRESENCE OF UNSPECIFIED ARTIFICIAL HIP JOINT: Chronic | ICD-10-CM

## 2018-04-20 DIAGNOSIS — Z79.899 OTHER LONG TERM (CURRENT) DRUG THERAPY: ICD-10-CM

## 2018-04-20 DIAGNOSIS — I10 ESSENTIAL (PRIMARY) HYPERTENSION: ICD-10-CM

## 2018-04-20 DIAGNOSIS — Q27.30 ARTERIOVENOUS MALFORMATION, SITE UNSPECIFIED: ICD-10-CM

## 2018-04-20 DIAGNOSIS — I71.02 DISSECTION OF ABDOMINAL AORTA: ICD-10-CM

## 2018-04-20 DIAGNOSIS — H26.9 UNSPECIFIED CATARACT: Chronic | ICD-10-CM

## 2018-04-20 DIAGNOSIS — Z86.79 PERSONAL HISTORY OF OTHER DISEASES OF THE CIRCULATORY SYSTEM: Chronic | ICD-10-CM

## 2018-04-20 LAB
ALBUMIN SERPL ELPH-MCNC: 3.7 G/DL — SIGNIFICANT CHANGE UP (ref 3.3–5)
ALP SERPL-CCNC: 100 U/L — SIGNIFICANT CHANGE UP (ref 40–120)
ALT FLD-CCNC: 31 U/L — SIGNIFICANT CHANGE UP (ref 12–78)
ANION GAP SERPL CALC-SCNC: 8 MMOL/L — SIGNIFICANT CHANGE UP (ref 5–17)
APTT BLD: 25.9 SEC — LOW (ref 27.5–37.4)
AST SERPL-CCNC: 24 U/L — SIGNIFICANT CHANGE UP (ref 15–37)
BASOPHILS # BLD AUTO: 0.01 K/UL — SIGNIFICANT CHANGE UP (ref 0–0.2)
BASOPHILS NFR BLD AUTO: 0.1 % — SIGNIFICANT CHANGE UP (ref 0–2)
BILIRUB SERPL-MCNC: 0.5 MG/DL — SIGNIFICANT CHANGE UP (ref 0.2–1.2)
BUN SERPL-MCNC: 23 MG/DL — SIGNIFICANT CHANGE UP (ref 7–23)
CALCIUM SERPL-MCNC: 9.7 MG/DL — SIGNIFICANT CHANGE UP (ref 8.5–10.1)
CHLORIDE SERPL-SCNC: 103 MMOL/L — SIGNIFICANT CHANGE UP (ref 96–108)
CK SERPL-CCNC: 74 U/L — SIGNIFICANT CHANGE UP (ref 26–192)
CO2 SERPL-SCNC: 25 MMOL/L — SIGNIFICANT CHANGE UP (ref 22–31)
CREAT SERPL-MCNC: 1.21 MG/DL — SIGNIFICANT CHANGE UP (ref 0.5–1.3)
EOSINOPHIL # BLD AUTO: 0.09 K/UL — SIGNIFICANT CHANGE UP (ref 0–0.5)
EOSINOPHIL NFR BLD AUTO: 1.3 % — SIGNIFICANT CHANGE UP (ref 0–6)
GLUCOSE SERPL-MCNC: 127 MG/DL — HIGH (ref 70–99)
HCT VFR BLD CALC: 40.5 % — SIGNIFICANT CHANGE UP (ref 34.5–45)
HGB BLD-MCNC: 13.7 G/DL — SIGNIFICANT CHANGE UP (ref 11.5–15.5)
IMM GRANULOCYTES NFR BLD AUTO: 0.4 % — SIGNIFICANT CHANGE UP (ref 0–1.5)
INR BLD: 1.07 RATIO — SIGNIFICANT CHANGE UP (ref 0.88–1.16)
LIDOCAIN IGE QN: 101 U/L — SIGNIFICANT CHANGE UP (ref 73–393)
LYMPHOCYTES # BLD AUTO: 1.02 K/UL — SIGNIFICANT CHANGE UP (ref 1–3.3)
LYMPHOCYTES # BLD AUTO: 15.1 % — SIGNIFICANT CHANGE UP (ref 13–44)
MCHC RBC-ENTMCNC: 29.4 PG — SIGNIFICANT CHANGE UP (ref 27–34)
MCHC RBC-ENTMCNC: 33.8 GM/DL — SIGNIFICANT CHANGE UP (ref 32–36)
MCV RBC AUTO: 86.9 FL — SIGNIFICANT CHANGE UP (ref 80–100)
MONOCYTES # BLD AUTO: 0.61 K/UL — SIGNIFICANT CHANGE UP (ref 0–0.9)
MONOCYTES NFR BLD AUTO: 9.1 % — SIGNIFICANT CHANGE UP (ref 2–14)
NEUTROPHILS # BLD AUTO: 4.98 K/UL — SIGNIFICANT CHANGE UP (ref 1.8–7.4)
NEUTROPHILS NFR BLD AUTO: 74 % — SIGNIFICANT CHANGE UP (ref 43–77)
NRBC # BLD: 0 /100 WBCS — SIGNIFICANT CHANGE UP (ref 0–0)
PLATELET # BLD AUTO: 192 K/UL — SIGNIFICANT CHANGE UP (ref 150–400)
POTASSIUM SERPL-MCNC: 4 MMOL/L — SIGNIFICANT CHANGE UP (ref 3.5–5.3)
POTASSIUM SERPL-SCNC: 4 MMOL/L — SIGNIFICANT CHANGE UP (ref 3.5–5.3)
PROT SERPL-MCNC: 7.2 GM/DL — SIGNIFICANT CHANGE UP (ref 6–8.3)
PROTHROM AB SERPL-ACNC: 11.6 SEC — SIGNIFICANT CHANGE UP (ref 9.8–12.7)
RBC # BLD: 4.66 M/UL — SIGNIFICANT CHANGE UP (ref 3.8–5.2)
RBC # FLD: 15 % — HIGH (ref 10.3–14.5)
SODIUM SERPL-SCNC: 136 MMOL/L — SIGNIFICANT CHANGE UP (ref 135–145)
TROPONIN I SERPL-MCNC: 0.02 NG/ML — SIGNIFICANT CHANGE UP (ref 0.01–0.04)
TROPONIN I SERPL-MCNC: <0.015 NG/ML — SIGNIFICANT CHANGE UP (ref 0.01–0.04)
TROPONIN I SERPL-MCNC: <0.015 NG/ML — SIGNIFICANT CHANGE UP (ref 0.01–0.04)
WBC # BLD: 6.74 K/UL — SIGNIFICANT CHANGE UP (ref 3.8–10.5)
WBC # FLD AUTO: 6.74 K/UL — SIGNIFICANT CHANGE UP (ref 3.8–10.5)

## 2018-04-20 PROCEDURE — 99285 EMERGENCY DEPT VISIT HI MDM: CPT

## 2018-04-20 PROCEDURE — 93010 ELECTROCARDIOGRAM REPORT: CPT

## 2018-04-20 PROCEDURE — 71045 X-RAY EXAM CHEST 1 VIEW: CPT | Mod: 26

## 2018-04-20 RX ORDER — ASPIRIN/CALCIUM CARB/MAGNESIUM 324 MG
325 TABLET ORAL ONCE
Qty: 0 | Refills: 0 | Status: COMPLETED | OUTPATIENT
Start: 2018-04-20 | End: 2018-04-20

## 2018-04-20 RX ORDER — SODIUM CHLORIDE 9 MG/ML
3 INJECTION INTRAMUSCULAR; INTRAVENOUS; SUBCUTANEOUS ONCE
Qty: 0 | Refills: 0 | Status: COMPLETED | OUTPATIENT
Start: 2018-04-20 | End: 2018-04-20

## 2018-04-20 RX ADMIN — SODIUM CHLORIDE 3 MILLILITER(S): 9 INJECTION INTRAMUSCULAR; INTRAVENOUS; SUBCUTANEOUS at 12:58

## 2018-04-20 RX ADMIN — Medication 325 MILLIGRAM(S): at 12:58

## 2018-04-20 NOTE — ED ADULT NURSE NOTE - CHPI ED SYMPTOMS POS
SYNCOPE/NAUSEA/SHORTNESS OF BREATH/CHEST DISCOMFORT/PALPITATIONS/COUGH/DIZZINESS/BACK PAIN/CHEST PAIN/CHEST TIGHTNESS

## 2018-04-20 NOTE — ED PROVIDER NOTE - OBJECTIVE STATEMENT
89 y-o Female with PMHX of AVM, HTN, HLD, Aortic dissection, dementia presents to the ED from NH c/o of CP. Pt's son at bedside states NH staff noted Pt started to c/o of CP, with associated mild SOB during lunch. Nursing staff also noted Pt's BP was slightly elevated and sent Pt to the ED. Pt states she currently feels fine and has no complaints at this time. Pt's son at Bedside notes Pt has Hx of Low H&H, and showed concern.

## 2018-04-20 NOTE — ED PROVIDER NOTE - MEDICAL DECISION MAKING DETAILS
Presents c/o of CP at NH, currently no acute complaints at this time. Will give Labs including cardiac enzymes, EKG, CXR, Asprin.

## 2018-08-01 PROBLEM — K22.10 ULCER OF ESOPHAGUS WITHOUT BLEEDING: Chronic | Status: ACTIVE | Noted: 2017-03-03

## 2018-08-01 PROBLEM — D05.12 INTRADUCTAL CARCINOMA IN SITU OF LEFT BREAST: Chronic | Status: ACTIVE | Noted: 2017-03-03

## 2018-08-01 PROBLEM — H35.9 UNSPECIFIED RETINAL DISORDER: Chronic | Status: ACTIVE | Noted: 2017-03-03

## 2018-08-01 PROBLEM — N64.4 MASTODYNIA: Chronic | Status: ACTIVE | Noted: 2017-03-03

## 2018-08-01 PROBLEM — M19.90 UNSPECIFIED OSTEOARTHRITIS, UNSPECIFIED SITE: Chronic | Status: ACTIVE | Noted: 2017-03-03

## 2018-08-13 ENCOUNTER — RX RENEWAL (OUTPATIENT)
Age: 83
End: 2018-08-13

## 2018-08-20 PROBLEM — C50.212: Status: ACTIVE | Noted: 2017-03-10

## 2018-08-21 ENCOUNTER — APPOINTMENT (OUTPATIENT)
Dept: BREAST CENTER | Facility: CLINIC | Age: 83
End: 2018-08-21
Payer: MEDICARE

## 2018-08-21 VITALS
WEIGHT: 135 LBS | HEIGHT: 66 IN | DIASTOLIC BLOOD PRESSURE: 68 MMHG | SYSTOLIC BLOOD PRESSURE: 122 MMHG | BODY MASS INDEX: 21.69 KG/M2 | HEART RATE: 72 BPM

## 2018-08-21 DIAGNOSIS — C50.212 MALIGNANT NEOPLASM OF UPPER-INNER QUADRANT OF LEFT FEMALE BREAST: ICD-10-CM

## 2018-08-21 PROCEDURE — 99214 OFFICE O/P EST MOD 30 MIN: CPT

## 2018-09-04 ENCOUNTER — EMERGENCY (EMERGENCY)
Facility: HOSPITAL | Age: 83
LOS: 1 days | Discharge: HOME | End: 2018-09-06
Attending: STUDENT IN AN ORGANIZED HEALTH CARE EDUCATION/TRAINING PROGRAM | Admitting: FAMILY MEDICINE
Payer: MEDICARE

## 2018-09-04 VITALS
WEIGHT: 149.91 LBS | RESPIRATION RATE: 18 BRPM | HEIGHT: 66 IN | HEART RATE: 79 BPM | OXYGEN SATURATION: 98 % | DIASTOLIC BLOOD PRESSURE: 78 MMHG | TEMPERATURE: 98 F | SYSTOLIC BLOOD PRESSURE: 149 MMHG

## 2018-09-04 DIAGNOSIS — Z96.649 PRESENCE OF UNSPECIFIED ARTIFICIAL HIP JOINT: Chronic | ICD-10-CM

## 2018-09-04 DIAGNOSIS — Z88.9 ALLERGY STATUS TO UNSPECIFIED DRUGS, MEDICAMENTS AND BIOLOGICAL SUBSTANCES: ICD-10-CM

## 2018-09-04 DIAGNOSIS — D05.11 INTRADUCTAL CARCINOMA IN SITU OF RIGHT BREAST: ICD-10-CM

## 2018-09-04 DIAGNOSIS — R07.9 CHEST PAIN, UNSPECIFIED: ICD-10-CM

## 2018-09-04 DIAGNOSIS — I10 ESSENTIAL (PRIMARY) HYPERTENSION: ICD-10-CM

## 2018-09-04 DIAGNOSIS — F03.90 UNSPECIFIED DEMENTIA WITHOUT BEHAVIORAL DISTURBANCE: ICD-10-CM

## 2018-09-04 DIAGNOSIS — K22.10 ULCER OF ESOPHAGUS WITHOUT BLEEDING: ICD-10-CM

## 2018-09-04 DIAGNOSIS — H26.9 UNSPECIFIED CATARACT: Chronic | ICD-10-CM

## 2018-09-04 DIAGNOSIS — Z79.82 LONG TERM (CURRENT) USE OF ASPIRIN: ICD-10-CM

## 2018-09-04 DIAGNOSIS — Z96.641 PRESENCE OF RIGHT ARTIFICIAL HIP JOINT: ICD-10-CM

## 2018-09-04 DIAGNOSIS — Z86.79 PERSONAL HISTORY OF OTHER DISEASES OF THE CIRCULATORY SYSTEM: Chronic | ICD-10-CM

## 2018-09-04 DIAGNOSIS — G89.29 OTHER CHRONIC PAIN: ICD-10-CM

## 2018-09-04 DIAGNOSIS — K55.21 ANGIODYSPLASIA OF COLON WITH HEMORRHAGE: ICD-10-CM

## 2018-09-04 DIAGNOSIS — M54.9 DORSALGIA, UNSPECIFIED: ICD-10-CM

## 2018-09-04 DIAGNOSIS — I08.3 COMBINED RHEUMATIC DISORDERS OF MITRAL, AORTIC AND TRICUSPID VALVES: ICD-10-CM

## 2018-09-04 DIAGNOSIS — Z79.899 OTHER LONG TERM (CURRENT) DRUG THERAPY: ICD-10-CM

## 2018-09-04 DIAGNOSIS — Z88.5 ALLERGY STATUS TO NARCOTIC AGENT: ICD-10-CM

## 2018-09-04 DIAGNOSIS — I71.00 DISSECTION OF UNSPECIFIED SITE OF AORTA: ICD-10-CM

## 2018-09-04 DIAGNOSIS — E78.5 HYPERLIPIDEMIA, UNSPECIFIED: ICD-10-CM

## 2018-09-04 LAB
ALBUMIN SERPL ELPH-MCNC: 3.7 G/DL — SIGNIFICANT CHANGE UP (ref 3.3–5)
ALP SERPL-CCNC: 96 U/L — SIGNIFICANT CHANGE UP (ref 40–120)
ALT FLD-CCNC: 30 U/L — SIGNIFICANT CHANGE UP (ref 12–78)
ANION GAP SERPL CALC-SCNC: 9 MMOL/L — SIGNIFICANT CHANGE UP (ref 5–17)
AST SERPL-CCNC: 29 U/L — SIGNIFICANT CHANGE UP (ref 15–37)
BASOPHILS # BLD AUTO: 0.01 K/UL — SIGNIFICANT CHANGE UP (ref 0–0.2)
BASOPHILS NFR BLD AUTO: 0.2 % — SIGNIFICANT CHANGE UP (ref 0–2)
BILIRUB SERPL-MCNC: 0.5 MG/DL — SIGNIFICANT CHANGE UP (ref 0.2–1.2)
BUN SERPL-MCNC: 25 MG/DL — HIGH (ref 7–23)
CALCIUM SERPL-MCNC: 9.2 MG/DL — SIGNIFICANT CHANGE UP (ref 8.5–10.1)
CHLORIDE SERPL-SCNC: 102 MMOL/L — SIGNIFICANT CHANGE UP (ref 96–108)
CO2 SERPL-SCNC: 26 MMOL/L — SIGNIFICANT CHANGE UP (ref 22–31)
CREAT SERPL-MCNC: 1.18 MG/DL — SIGNIFICANT CHANGE UP (ref 0.5–1.3)
EOSINOPHIL # BLD AUTO: 0 K/UL — SIGNIFICANT CHANGE UP (ref 0–0.5)
EOSINOPHIL NFR BLD AUTO: 0 % — SIGNIFICANT CHANGE UP (ref 0–6)
GLUCOSE SERPL-MCNC: 143 MG/DL — HIGH (ref 70–99)
HCT VFR BLD CALC: 40.9 % — SIGNIFICANT CHANGE UP (ref 34.5–45)
HGB BLD-MCNC: 13.9 G/DL — SIGNIFICANT CHANGE UP (ref 11.5–15.5)
IMM GRANULOCYTES NFR BLD AUTO: 0.5 % — SIGNIFICANT CHANGE UP (ref 0–1.5)
LYMPHOCYTES # BLD AUTO: 0.79 K/UL — LOW (ref 1–3.3)
LYMPHOCYTES # BLD AUTO: 12.7 % — LOW (ref 13–44)
MCHC RBC-ENTMCNC: 29.6 PG — SIGNIFICANT CHANGE UP (ref 27–34)
MCHC RBC-ENTMCNC: 34 GM/DL — SIGNIFICANT CHANGE UP (ref 32–36)
MCV RBC AUTO: 87.2 FL — SIGNIFICANT CHANGE UP (ref 80–100)
MONOCYTES # BLD AUTO: 0.6 K/UL — SIGNIFICANT CHANGE UP (ref 0–0.9)
MONOCYTES NFR BLD AUTO: 9.6 % — SIGNIFICANT CHANGE UP (ref 2–14)
NEUTROPHILS # BLD AUTO: 4.79 K/UL — SIGNIFICANT CHANGE UP (ref 1.8–7.4)
NEUTROPHILS NFR BLD AUTO: 77 % — SIGNIFICANT CHANGE UP (ref 43–77)
NT-PROBNP SERPL-SCNC: 518 PG/ML — HIGH (ref 0–450)
PLATELET # BLD AUTO: 189 K/UL — SIGNIFICANT CHANGE UP (ref 150–400)
POTASSIUM SERPL-MCNC: 4.1 MMOL/L — SIGNIFICANT CHANGE UP (ref 3.5–5.3)
POTASSIUM SERPL-SCNC: 4.1 MMOL/L — SIGNIFICANT CHANGE UP (ref 3.5–5.3)
PROT SERPL-MCNC: 7.3 GM/DL — SIGNIFICANT CHANGE UP (ref 6–8.3)
RBC # BLD: 4.69 M/UL — SIGNIFICANT CHANGE UP (ref 3.8–5.2)
RBC # FLD: 14.6 % — HIGH (ref 10.3–14.5)
SODIUM SERPL-SCNC: 137 MMOL/L — SIGNIFICANT CHANGE UP (ref 135–145)
TROPONIN I SERPL-MCNC: 0.02 NG/ML — SIGNIFICANT CHANGE UP (ref 0.01–0.04)
TROPONIN I SERPL-MCNC: 0.03 NG/ML — SIGNIFICANT CHANGE UP (ref 0.01–0.04)
WBC # BLD: 6.22 K/UL — SIGNIFICANT CHANGE UP (ref 3.8–10.5)
WBC # FLD AUTO: 6.22 K/UL — SIGNIFICANT CHANGE UP (ref 3.8–10.5)

## 2018-09-04 PROCEDURE — 93010 ELECTROCARDIOGRAM REPORT: CPT

## 2018-09-04 PROCEDURE — 71046 X-RAY EXAM CHEST 2 VIEWS: CPT | Mod: 26

## 2018-09-04 PROCEDURE — 99285 EMERGENCY DEPT VISIT HI MDM: CPT

## 2018-09-04 RX ORDER — TRAMADOL HYDROCHLORIDE 50 MG/1
50 TABLET ORAL EVERY 8 HOURS
Qty: 0 | Refills: 0 | Status: DISCONTINUED | OUTPATIENT
Start: 2018-09-04 | End: 2018-09-06

## 2018-09-04 RX ORDER — LOSARTAN POTASSIUM 100 MG/1
25 TABLET, FILM COATED ORAL DAILY
Qty: 0 | Refills: 0 | Status: DISCONTINUED | OUTPATIENT
Start: 2018-09-05 | End: 2018-09-06

## 2018-09-04 RX ORDER — ANASTROZOLE 1 MG/1
1 TABLET ORAL DAILY
Qty: 0 | Refills: 0 | Status: DISCONTINUED | OUTPATIENT
Start: 2018-09-05 | End: 2018-09-06

## 2018-09-04 RX ORDER — SPIRONOLACTONE 25 MG/1
25 TABLET, FILM COATED ORAL DAILY
Qty: 0 | Refills: 0 | Status: DISCONTINUED | OUTPATIENT
Start: 2018-09-05 | End: 2018-09-06

## 2018-09-04 RX ORDER — SUCRALFATE 1 G
1 TABLET ORAL
Qty: 0 | Refills: 0 | Status: DISCONTINUED | OUTPATIENT
Start: 2018-09-04 | End: 2018-09-06

## 2018-09-04 RX ORDER — ACETAMINOPHEN 500 MG
2 TABLET ORAL
Qty: 0 | Refills: 0 | COMMUNITY

## 2018-09-04 RX ORDER — SIMVASTATIN 20 MG/1
1 TABLET, FILM COATED ORAL
Qty: 0 | Refills: 0 | COMMUNITY

## 2018-09-04 RX ORDER — LOSARTAN POTASSIUM 100 MG/1
1 TABLET, FILM COATED ORAL
Qty: 0 | Refills: 0 | COMMUNITY

## 2018-09-04 RX ORDER — FERROUS SULFATE 325(65) MG
1 TABLET ORAL
Qty: 0 | Refills: 0 | COMMUNITY

## 2018-09-04 RX ORDER — HEPARIN SODIUM 5000 [USP'U]/ML
5000 INJECTION INTRAVENOUS; SUBCUTANEOUS EVERY 12 HOURS
Qty: 0 | Refills: 0 | Status: DISCONTINUED | OUTPATIENT
Start: 2018-09-04 | End: 2018-09-06

## 2018-09-04 RX ORDER — PREGABALIN 225 MG/1
1000 CAPSULE ORAL
Qty: 0 | Refills: 0 | COMMUNITY

## 2018-09-04 RX ORDER — FERROUS SULFATE 325(65) MG
325 TABLET ORAL
Qty: 0 | Refills: 0 | Status: DISCONTINUED | OUTPATIENT
Start: 2018-09-04 | End: 2018-09-06

## 2018-09-04 RX ORDER — CALCIUM CARBONATE 500(1250)
1 TABLET ORAL
Qty: 0 | Refills: 0 | COMMUNITY

## 2018-09-04 RX ORDER — PANTOPRAZOLE SODIUM 20 MG/1
40 TABLET, DELAYED RELEASE ORAL
Qty: 0 | Refills: 0 | Status: DISCONTINUED | OUTPATIENT
Start: 2018-09-04 | End: 2018-09-06

## 2018-09-04 RX ORDER — OXYCODONE HYDROCHLORIDE 5 MG/1
10 TABLET ORAL EVERY 12 HOURS
Qty: 0 | Refills: 0 | Status: DISCONTINUED | OUTPATIENT
Start: 2018-09-04 | End: 2018-09-05

## 2018-09-04 RX ORDER — CHOLECALCIFEROL (VITAMIN D3) 125 MCG
1000 CAPSULE ORAL DAILY
Qty: 0 | Refills: 0 | Status: DISCONTINUED | OUTPATIENT
Start: 2018-09-04 | End: 2018-09-06

## 2018-09-04 RX ORDER — INFLUENZA VIRUS VACCINE 15; 15; 15; 15 UG/.5ML; UG/.5ML; UG/.5ML; UG/.5ML
0.5 SUSPENSION INTRAMUSCULAR ONCE
Qty: 0 | Refills: 0 | Status: COMPLETED | OUTPATIENT
Start: 2018-09-04 | End: 2018-09-06

## 2018-09-04 RX ORDER — PREGABALIN 225 MG/1
1000 CAPSULE ORAL DAILY
Qty: 0 | Refills: 0 | Status: DISCONTINUED | OUTPATIENT
Start: 2018-09-05 | End: 2018-09-06

## 2018-09-04 RX ORDER — AMLODIPINE BESYLATE 2.5 MG/1
10 TABLET ORAL DAILY
Qty: 0 | Refills: 0 | Status: DISCONTINUED | OUTPATIENT
Start: 2018-09-05 | End: 2018-09-06

## 2018-09-04 RX ORDER — ANASTROZOLE 1 MG/1
1 TABLET ORAL
Qty: 0 | Refills: 0 | COMMUNITY

## 2018-09-04 RX ORDER — SIMVASTATIN 20 MG/1
40 TABLET, FILM COATED ORAL AT BEDTIME
Qty: 0 | Refills: 0 | Status: DISCONTINUED | OUTPATIENT
Start: 2018-09-04 | End: 2018-09-06

## 2018-09-04 RX ORDER — CHOLECALCIFEROL (VITAMIN D3) 125 MCG
2 CAPSULE ORAL
Qty: 0 | Refills: 0 | COMMUNITY

## 2018-09-04 RX ORDER — CALCIUM CARBONATE 500(1250)
2 TABLET ORAL
Qty: 0 | Refills: 0 | COMMUNITY

## 2018-09-04 RX ORDER — ALLOPURINOL 300 MG
100 TABLET ORAL DAILY
Qty: 0 | Refills: 0 | Status: DISCONTINUED | OUTPATIENT
Start: 2018-09-05 | End: 2018-09-06

## 2018-09-04 RX ORDER — ACETAMINOPHEN 500 MG
650 TABLET ORAL EVERY 6 HOURS
Qty: 0 | Refills: 0 | Status: DISCONTINUED | OUTPATIENT
Start: 2018-09-04 | End: 2018-09-06

## 2018-09-04 RX ORDER — AMLODIPINE BESYLATE 2.5 MG/1
1 TABLET ORAL
Qty: 0 | Refills: 0 | COMMUNITY

## 2018-09-04 RX ORDER — TAPENTADOL HYDROCHLORIDE 50 MG/1
1 TABLET, FILM COATED ORAL
Qty: 0 | Refills: 0 | COMMUNITY

## 2018-09-04 RX ADMIN — SIMVASTATIN 40 MILLIGRAM(S): 20 TABLET, FILM COATED ORAL at 23:37

## 2018-09-04 NOTE — ED PROVIDER NOTE - ATTENDING CONTRIBUTION TO CARE
I, Rashida Rice DO,  performed the initial face to face bedside interview with this patient regarding history of present illness, review of symptoms and relevant past medical, social and family history.  I completed an independent physical examination.  I was the initial provider who evaluated this patient. I have signed out the follow up of any pending tests (i.e. labs, radiological studies) to the ACP.  I have communicated the patient’s plan of care and disposition with the ACP.  The history, relevant review of systems, past medical and surgical history, medical decision making, and physical examination was documented by the scribe in my presence and I attest to the accuracy of the documentation.

## 2018-09-04 NOTE — H&P ADULT - ASSESSMENT
89 yo female with PMH of dementia, HTN, HLD, h/o aortic dissection (being monitored as outpatient), breast CA, h/o GI bleed from AVMs, esophagitis, OA, chronic back pain presented with chest pain.    #Chest pain - r/o ACS  - place in observation in tele  - serial cardiac enzymes x2 negative, will obtain 3rd set  - cardio consult in AM    #HTN  - continue home meds    #HLD  - continue statin    #h.o GI bleed, esophagitis  - continue PPI and sucralfate    #DVT prophylaxis  - heparin sq    IMPROVE VTE Individual Risk Assessment    RISK                                                                Points    [  ] Previous VTE                                                  3    [  ] Thrombophilia                                               2    [  ] Lower limb paralysis                                      2        (unable to hold up >15 seconds)      [  ] Current Cancer                                              2         (within 6 months)    [  ] Immobilization > 24 hrs                                1    [  ] ICU/CCU stay > 24 hours                              1    [x  ] Age > 60                                                      1    IMPROVE VTE Score ___1______ 89 yo female with PMH of dementia, HTN, HLD, h/o aortic dissection (being monitored as outpatient), breast CA, h/o GI bleed from AVMs, esophagitis, OA, chronic back pain presented with chest pain.    #Chest pain - r/o ACS  - place in observation in tele  - serial cardiac enzymes x2 negative, will obtain 3rd set  - cardio consult in AM    #HTN  - continue home meds    #HLD  - continue statin    #h.o GI bleed, esophagitis  - continue PPI and sucralfate    #Chronic back pain  - nucynta switched to oxycontin while in house  - tramadol PRN for breakthrough pain    #DVT prophylaxis  - heparin sq    IMPROVE VTE Individual Risk Assessment    RISK                                                                Points    [  ] Previous VTE                                                  3    [  ] Thrombophilia                                               2    [  ] Lower limb paralysis                                      2        (unable to hold up >15 seconds)      [  ] Current Cancer                                              2         (within 6 months)    [  ] Immobilization > 24 hrs                                1    [  ] ICU/CCU stay > 24 hours                              1    [x  ] Age > 60                                                      1    IMPROVE VTE Score ___1______

## 2018-09-04 NOTE — PATIENT PROFILE ADULT. - NS TRANSFER PATIENT BELONGINGS
Clothing/black bag Other belongings/Clothing/black bag, Nucynta ER Medication IN PHARMACY. PLEASE DO NOT FORGET TO SEND HOME WITH PATIENT

## 2018-09-04 NOTE — ED ADULT NURSE REASSESSMENT NOTE - NS ED NURSE REASSESS COMMENT FT1
just assessed pt before sending up stairs. pt is aox2. denies any sob or chest pain. states she is just tired.

## 2018-09-04 NOTE — ED ADULT NURSE NOTE - OBJECTIVE STATEMENT
Pt BIBA to ED for complaints of chest pain and SOB. Pt denies chest pain and SOB. Pt denies fever, nvd, palpitations, and any generalized discomfort. Pt is a poor historian. Pt is a&ox2, pt is not oriented to time and is forgetful. Pt denies having chest pain prior to coming to hospital . Pt has unlabored breathing, skin is warm and pink. 02 sat 98%, other VSS. Pt safe and comfortable without distress.

## 2018-09-04 NOTE — ED PROVIDER NOTE - MEDICAL DECISION MAKING DETAILS
89 y/o female with CP this morning. No complaints at this time. Will order EKG, chest XR, labs reeval.

## 2018-09-04 NOTE — H&P ADULT - HISTORY OF PRESENT ILLNESS
89 yo female with PMH of dementia, HTN, HLD, h/o aortic dissection (being monitored as outpatient), breast CA, h/o GI bleed from AVMs, esophagitis, OA, chronic back pain sent to ED from [facility] living for chest pain and SOB. Pt is a very poor historian secondary to dementia. History obtained from son over the phone. As per son pt was complaining of chest pain this morning associated with SOB. Has had episodes in the past and workup has been negative. She does have a knows stable aortic dissection which is being monitored as outpatient. When speaking with patient she denies any chest pain and states she fell this morning. When speaking with son he states she always tells everyone she has fallen when she comes to ER but she has not had a fall. Currently resting comfortably with no complaints. No chest pain or SOB currently.     In ED cardiac enzymes x2 negative. Cardio was called who recommended observation overnight.

## 2018-09-04 NOTE — ED ADULT NURSE NOTE - CHPI ED NUR SYMPTOMS NEG
no congestion/no chest pain/no fever/no nausea/no syncope/no vomiting/no diaphoresis/no dizziness/no shortness of breath/no back pain

## 2018-09-04 NOTE — ED ADULT NURSE NOTE - NSIMPLEMENTINTERV_GEN_ALL_ED
Implemented All Fall with Harm Risk Interventions:  [place] to call system. Call bell, personal items and telephone within reach. Instruct patient to call for assistance. Room bathroom lighting operational. Non-slip footwear when patient is off stretcher. Physically safe environment: no spills, clutter or unnecessary equipment. Stretcher in lowest position, wheels locked, appropriate side rails in place. Provide visual cue, wrist band, yellow gown, etc. Monitor gait and stability. Monitor for mental status changes and reorient to person, place, and time. Review medications for side effects contributing to fall risk. Reinforce activity limits and safety measures with patient and family. Provide visual clues: red socks.

## 2018-09-04 NOTE — H&P ADULT - NSHPPHYSICALEXAM_GEN_ALL_CORE
Vital Signs Last 24 Hrs  T(C): 36.4 (04 Sep 2018 20:47), Max: 36.7 (04 Sep 2018 16:41)  T(F): 97.5 (04 Sep 2018 20:47), Max: 98 (04 Sep 2018 16:41)  HR: 78 (04 Sep 2018 20:47) (78 - 79)  BP: 149/68 (04 Sep 2018 20:47) (140/69 - 149/78)  BP(mean): --  RR: 18 (04 Sep 2018 20:47) (17 - 18)  SpO2: 92% (04 Sep 2018 20:47) (92% - 99%)

## 2018-09-04 NOTE — ED PROVIDER NOTE - PROGRESS NOTE DETAILS
Krystal DO: S/o to Dr. Sena pending second troponin and re-eval Pending repeat cardiac enzyme. Patient states she has had no SOB or pain since being in the ED. -SM Spoke with Dr. Palla of cardiology -explained case and trop level and states that patient should be admitted. Elliott Sena M.D., Attending Physician

## 2018-09-04 NOTE — ED ADULT TRIAGE NOTE - CHIEF COMPLAINT QUOTE
Patient comes to ED for chest pain and SOB that started 1 hour ago. pt received 162mg asa In EMS with relief

## 2018-09-04 NOTE — ED PROVIDER NOTE - OBJECTIVE STATEMENT
89 y/o female with a PMHx of AVM, HTN, HLD, osteoarthritis, aortic dissection presents to the ED c/o CP starting an hour PTA. Pt notes "she feels like she was in an accident." Pt was given ASA PTA. Pt has no current CP. Pt notes she does not have any complaints at this time.

## 2018-09-05 DIAGNOSIS — I10 ESSENTIAL (PRIMARY) HYPERTENSION: ICD-10-CM

## 2018-09-05 DIAGNOSIS — I71.02 DISSECTION OF ABDOMINAL AORTA: ICD-10-CM

## 2018-09-05 DIAGNOSIS — R07.9 CHEST PAIN, UNSPECIFIED: ICD-10-CM

## 2018-09-05 LAB
ANION GAP SERPL CALC-SCNC: 8 MMOL/L — SIGNIFICANT CHANGE UP (ref 5–17)
BASOPHILS # BLD AUTO: 0.01 K/UL — SIGNIFICANT CHANGE UP (ref 0–0.2)
BASOPHILS NFR BLD AUTO: 0.2 % — SIGNIFICANT CHANGE UP (ref 0–2)
BUN SERPL-MCNC: 24 MG/DL — HIGH (ref 7–23)
CALCIUM SERPL-MCNC: 9.7 MG/DL — SIGNIFICANT CHANGE UP (ref 8.5–10.1)
CHLORIDE SERPL-SCNC: 103 MMOL/L — SIGNIFICANT CHANGE UP (ref 96–108)
CHOLEST SERPL-MCNC: 136 MG/DL — SIGNIFICANT CHANGE UP (ref 10–199)
CO2 SERPL-SCNC: 29 MMOL/L — SIGNIFICANT CHANGE UP (ref 22–31)
CREAT SERPL-MCNC: 1.19 MG/DL — SIGNIFICANT CHANGE UP (ref 0.5–1.3)
EOSINOPHIL # BLD AUTO: 0.01 K/UL — SIGNIFICANT CHANGE UP (ref 0–0.5)
EOSINOPHIL NFR BLD AUTO: 0.2 % — SIGNIFICANT CHANGE UP (ref 0–6)
GLUCOSE SERPL-MCNC: 130 MG/DL — HIGH (ref 70–99)
HCT VFR BLD CALC: 42.1 % — SIGNIFICANT CHANGE UP (ref 34.5–45)
HDLC SERPL-MCNC: 31 MG/DL — LOW
HGB BLD-MCNC: 14 G/DL — SIGNIFICANT CHANGE UP (ref 11.5–15.5)
IMM GRANULOCYTES NFR BLD AUTO: 0.5 % — SIGNIFICANT CHANGE UP (ref 0–1.5)
LIPID PNL WITH DIRECT LDL SERPL: 80 MG/DL — SIGNIFICANT CHANGE UP
LYMPHOCYTES # BLD AUTO: 0.86 K/UL — LOW (ref 1–3.3)
LYMPHOCYTES # BLD AUTO: 13.4 % — SIGNIFICANT CHANGE UP (ref 13–44)
MAGNESIUM SERPL-MCNC: 1.7 MG/DL — SIGNIFICANT CHANGE UP (ref 1.6–2.6)
MCHC RBC-ENTMCNC: 29.2 PG — SIGNIFICANT CHANGE UP (ref 27–34)
MCHC RBC-ENTMCNC: 33.3 GM/DL — SIGNIFICANT CHANGE UP (ref 32–36)
MCV RBC AUTO: 87.9 FL — SIGNIFICANT CHANGE UP (ref 80–100)
MONOCYTES # BLD AUTO: 0.63 K/UL — SIGNIFICANT CHANGE UP (ref 0–0.9)
MONOCYTES NFR BLD AUTO: 9.8 % — SIGNIFICANT CHANGE UP (ref 2–14)
NEUTROPHILS # BLD AUTO: 4.87 K/UL — SIGNIFICANT CHANGE UP (ref 1.8–7.4)
NEUTROPHILS NFR BLD AUTO: 75.9 % — SIGNIFICANT CHANGE UP (ref 43–77)
NRBC # BLD: 0 /100 WBCS — SIGNIFICANT CHANGE UP (ref 0–0)
PLATELET # BLD AUTO: 186 K/UL — SIGNIFICANT CHANGE UP (ref 150–400)
POTASSIUM SERPL-MCNC: 4.1 MMOL/L — SIGNIFICANT CHANGE UP (ref 3.5–5.3)
POTASSIUM SERPL-SCNC: 4.1 MMOL/L — SIGNIFICANT CHANGE UP (ref 3.5–5.3)
RBC # BLD: 4.79 M/UL — SIGNIFICANT CHANGE UP (ref 3.8–5.2)
RBC # FLD: 14.7 % — HIGH (ref 10.3–14.5)
SODIUM SERPL-SCNC: 140 MMOL/L — SIGNIFICANT CHANGE UP (ref 135–145)
TOTAL CHOLESTEROL/HDL RATIO MEASUREMENT: 4.4 RATIO — SIGNIFICANT CHANGE UP (ref 3.3–7.1)
TRIGL SERPL-MCNC: 123 MG/DL — SIGNIFICANT CHANGE UP (ref 10–149)
TROPONIN I SERPL-MCNC: 0.02 NG/ML — SIGNIFICANT CHANGE UP (ref 0.01–0.04)
TROPONIN I SERPL-MCNC: 0.03 NG/ML — SIGNIFICANT CHANGE UP (ref 0.01–0.04)
WBC # BLD: 6.41 K/UL — SIGNIFICANT CHANGE UP (ref 3.8–10.5)
WBC # FLD AUTO: 6.41 K/UL — SIGNIFICANT CHANGE UP (ref 3.8–10.5)

## 2018-09-05 PROCEDURE — 93010 ELECTROCARDIOGRAM REPORT: CPT

## 2018-09-05 PROCEDURE — 99223 1ST HOSP IP/OBS HIGH 75: CPT

## 2018-09-05 RX ORDER — METOPROLOL TARTRATE 50 MG
25 TABLET ORAL DAILY
Qty: 0 | Refills: 0 | Status: DISCONTINUED | OUTPATIENT
Start: 2018-09-05 | End: 2018-09-06

## 2018-09-05 RX ADMIN — TRAMADOL HYDROCHLORIDE 50 MILLIGRAM(S): 50 TABLET ORAL at 00:23

## 2018-09-05 RX ADMIN — Medication 325 MILLIGRAM(S): at 17:28

## 2018-09-05 RX ADMIN — OXYCODONE HYDROCHLORIDE 10 MILLIGRAM(S): 5 TABLET ORAL at 05:48

## 2018-09-05 RX ADMIN — OXYCODONE HYDROCHLORIDE 10 MILLIGRAM(S): 5 TABLET ORAL at 05:35

## 2018-09-05 RX ADMIN — Medication 25 MILLIGRAM(S): at 17:28

## 2018-09-05 RX ADMIN — Medication 100 MILLIGRAM(S): at 10:58

## 2018-09-05 RX ADMIN — LOSARTAN POTASSIUM 25 MILLIGRAM(S): 100 TABLET, FILM COATED ORAL at 05:36

## 2018-09-05 RX ADMIN — HEPARIN SODIUM 5000 UNIT(S): 5000 INJECTION INTRAVENOUS; SUBCUTANEOUS at 05:36

## 2018-09-05 RX ADMIN — TRAMADOL HYDROCHLORIDE 50 MILLIGRAM(S): 50 TABLET ORAL at 10:59

## 2018-09-05 RX ADMIN — Medication 1 GRAM(S): at 05:36

## 2018-09-05 RX ADMIN — Medication 1 GRAM(S): at 17:29

## 2018-09-05 RX ADMIN — ANASTROZOLE 1 MILLIGRAM(S): 1 TABLET ORAL at 10:59

## 2018-09-05 RX ADMIN — AMLODIPINE BESYLATE 10 MILLIGRAM(S): 2.5 TABLET ORAL at 05:36

## 2018-09-05 RX ADMIN — PANTOPRAZOLE SODIUM 40 MILLIGRAM(S): 20 TABLET, DELAYED RELEASE ORAL at 05:36

## 2018-09-05 RX ADMIN — Medication 325 MILLIGRAM(S): at 05:36

## 2018-09-05 RX ADMIN — Medication 1000 UNIT(S): at 10:58

## 2018-09-05 RX ADMIN — SPIRONOLACTONE 25 MILLIGRAM(S): 25 TABLET, FILM COATED ORAL at 05:37

## 2018-09-05 RX ADMIN — PANTOPRAZOLE SODIUM 40 MILLIGRAM(S): 20 TABLET, DELAYED RELEASE ORAL at 17:28

## 2018-09-05 RX ADMIN — SIMVASTATIN 40 MILLIGRAM(S): 20 TABLET, FILM COATED ORAL at 20:38

## 2018-09-05 RX ADMIN — PREGABALIN 1000 MICROGRAM(S): 225 CAPSULE ORAL at 10:58

## 2018-09-05 RX ADMIN — HEPARIN SODIUM 5000 UNIT(S): 5000 INJECTION INTRAVENOUS; SUBCUTANEOUS at 17:28

## 2018-09-05 NOTE — PROGRESS NOTE ADULT - SUBJECTIVE AND OBJECTIVE BOX
Subjective:  Patient is a 89y old  Female who presents with a chief complaint of chest pain   HPI:     89 yo female with PMH of dementia, HTN, HLD, h/o aortic dissection (being monitored as outpatient), breast CA, h/o GI bleed from AVMs, esophagitis, OA, chronic back pain sent to ED from [facility]  on 9/5/18 for chest pain and SOB. Pt is a very poor historian secondary to dementia. History obtained from son over the phone. As per son pt was complaining of chest pain this morning associated with SOB. Has had episodes in the past and workup has been negative. She does have a knows stable aortic dissection which is being monitored as outpatient. When speaking with patient she denies any chest pain and states she fell this morning. When speaking with son he states she always tells everyone she has fallen when she comes to ER but she has not had a fall. Currently resting comfortably with no complaints. No chest pain or SOB currently.   In ED cardiac enzymes x2 negative. Cardio was called who recommended observation overnight.   9/5/18 - Patient seen and examined at bedside earlier today, poor historian, confused , denies chest pain     Review of system- Rest of the review of system are negative except mentioned in HPI    T(C): 36.3 (09-05-18 @ 16:42), Max: 36.5 (09-05-18 @ 09:56)  T(F): 97.3 (09-05-18 @ 16:42), Max: 97.7 (09-05-18 @ 09:56)  HR: 78 (09-05-18 @ 16:42) (73 - 79)  BP: 124/63 (09-05-18 @ 16:42) (124/63 - 157/68)  RR: 18 (09-05-18 @ 16:42) (17 - 18)  SpO2: 95% (09-05-18 @ 16:42) (92% - 97%)  Wt(kg): --    PHYSICAL EXAM:  GENERAL: NAD  NERVOUS SYSTEM:  Alert & Oriented X3, non- focal exam, Motor Strength 5/5 B/L upper and lower extremities; DTRs 2+ intact and symmetric  HEAD:  Atraumatic, Normocephalic  EYES: EOMI, PERRLA, conjunctiva and sclera clear  HEENT: Moist mucous membranes  NECK: Supple, No JVD  CHEST/LUNG: Clear to auscultation bilaterally; No rales, no rhonchi, no wheezing, or rubs  HEART: Regular rate and rhythm; + systolic  murmurs, rubs, or gallops  ABDOMEN: Soft, Nontender, Nondistended; Bowel sounds present  GENITOURINARY- Voiding, no suprapubic tenderness  EXTREMITIES:  2+ Peripheral Pulses, No clubbing, cyanosis, or edema  MUSCULOSKELETAL:- No muscle tenderness, Muscle tone normal, No joint tenderness, no Joint swelling, Joint range of motion-normal  SKIN-no rash, no lesion    LABS:                        14.0   6.41  )-----------( 186      ( 05 Sep 2018 05:35 )             42.1     09-05    140  |  103  |  24<H>  ----------------------------<  130<H>  4.1   |  29  |  1.19    Ca    9.7      05 Sep 2018 05:35  Mg     1.7     09-05    TPro  7.3  /  Alb  3.7  /  TBili  0.5  /  DBili  x   /  AST  29  /  ALT  30  /  AlkPhos  96  09-04      CARDIAC MARKERS ( 05 Sep 2018 05:35 )  0.023 ng/mL / x     / x     / x     / x      CARDIAC MARKERS ( 04 Sep 2018 23:56 )  0.029 ng/mL / x     / x     / x     / x      CARDIAC MARKERS ( 04 Sep 2018 17:00 )  0.029 ng/mL / x     / x     / x     / x      CARDIAC MARKERS ( 04 Sep 2018 13:19 )  0.021 ng/mL / x     / x     / x     / x        < from: 12 Lead ECG (09.05.18 @ 09:42) >  Diagnosis Line Normal sinus rhythm  Left axis deviation  Voltage criteria for left ventricular hypertrophy  Abnormal ECG  When compared with ECG of 04-SEP-2018 13:08,    < end of copied text >      < from: Xray Chest 2 Views PA/Lat (09.04.18 @ 14:55) >  The heart is enlarged. Retrocardiac hernia. The apices are unremarkable.   No focal consolidation. Elevation of the right hemidiaphragm..   Degenerative changes of the visualized osseous structures.    Impression:    No acute disease    No significant interval change as compared to  4/20/2018    < end of copied text >    RADIOLOGY & ADDITIONAL TESTS:    Current medications:  acetaminophen   Tablet .. 650 milliGRAM(s) Oral every 6 hours PRN  allopurinol 100 milliGRAM(s) Oral daily  amLODIPine   Tablet 10 milliGRAM(s) Oral daily  anastrozole 1 milliGRAM(s) Oral daily  cholecalciferol 1000 Unit(s) Oral daily  cyanocobalamin 1000 MICROGram(s) Oral daily  ferrous    sulfate 325 milliGRAM(s) Oral two times a day  heparin  Injectable 5000 Unit(s) SubCutaneous every 12 hours  influenza   Vaccine 0.5 milliLiter(s) IntraMuscular once  losartan 25 milliGRAM(s) Oral daily  metoprolol succinate ER 25 milliGRAM(s) Oral daily  Nucynta ER 50mg 1 Tablet(s) 50 milliGRAM(s) Oral two times a day  pantoprazole    Tablet 40 milliGRAM(s) Oral two times a day  simvastatin 40 milliGRAM(s) Oral at bedtime  spironolactone 25 milliGRAM(s) Oral daily  sucralfate 1 Gram(s) Oral two times a day  traMADol 50 milliGRAM(s) Oral every 8 hours PRN

## 2018-09-05 NOTE — PROGRESS NOTE ADULT - ASSESSMENT
· Assessment		  89 yo female with PMH of dementia, HTN, HLD, h/o aortic dissection (being monitored as outpatient), breast CA, h/o GI bleed from AVMs, esophagitis, OA, chronic back pain presented with chest pain.    #Chest pain, atypical, ruled out ACS , suspected GI etiology resolved   # systolic murmur r/o AS   - place in observation in tele  - serial cardiac enzymes x2 negative, will obtain 3rd set  - cardio consult  - stable, add BB   - 2 d echo    #HTN  - continue home meds    #HLD  - continue statin    #h.o GI bleed, esophagitis  - continue PPI and sucralfate    #Chronic back pain  - Nucynta, stop oxycodone - pt more delirious on it   - tramadol PRN for breakthrough pain    #DVT prophylaxis  - heparin sq    Dispo - PT, d/c planning

## 2018-09-05 NOTE — CONSULT NOTE ADULT - SUBJECTIVE AND OBJECTIVE BOX
PCP:    REQUESTING PHYSICIAN:    REASON FOR CONSULT:    CHIEF COMPLAINT:    HPI:  89 yo female with PMH of dementia, HTN, HLD, h/o aortic dissection (being monitored as outpatient), breast CA, h/o GI bleed from AVMs, esophagitis, OA, chronic back pain sent to ED from [facility] for chest pain and SOB. Pt is a very poor historian secondary to dementia. History obtained from son over the phone. As per son pt was complaining of chest pain this morning associated with SOB. Has had episodes in the past and workup has been negative. She does have a stable aortic dissection which is being monitored as outpatient. When speaking with patient she denies any chest pain and states she fell this morning. When speaking with son he states she always tells everyone she has fallen when she comes to ER but she has not had a fall. Currently resting comfortably with no complaints. No chest pain or SOB currently.     In ED cardiac enzymes x2 negative. Cardio was called who recommended observation overnight. (04 Sep 2018 21:00)      PAST MEDICAL & SURGICAL HISTORY:  Retina disorder  Breast pain, left  Ductal carcinoma in situ (DCIS) of left breast  Esophageal erosions  Osteoarthritis  AVM (arteriovenous malformation)  Renal cyst  Hearing loss, unspecified laterality: uses [employer] aides B/L  Aortic dissection, abdominal: treated medically and under obeservation as per daughter  Back pain: Comporession fx L4  Carpal tunnel syndrome  Hyperlipidemia  Hypertension  History of hip replacement  H/O aneurysm: renal; s/p repair   Cataract, acquired: surgery does not rmember the date or laterality   delivery delivered: 1950      Allergies    oxycodone (Other)    Intolerances    gabapentin (Stomach Upset; Vomiting)      SOCIAL HISTORY:    FAMILY HISTORY:  Family history of heart disease (Sibling)  Family history of heart disease (Father)      MEDICATIONS:  MEDICATIONS  (STANDING):  allopurinol 100 milliGRAM(s) Oral daily  amLODIPine   Tablet 10 milliGRAM(s) Oral daily  anastrozole 1 milliGRAM(s) Oral daily  cholecalciferol 1000 Unit(s) Oral daily  cyanocobalamin 1000 MICROGram(s) Oral daily  ferrous    sulfate 325 milliGRAM(s) Oral two times a day  heparin  Injectable 5000 Unit(s) SubCutaneous every 12 hours  influenza   Vaccine 0.5 milliLiter(s) IntraMuscular once  losartan 25 milliGRAM(s) Oral daily  Nucynta ER 50mg 1 Tablet(s) 50 milliGRAM(s) Oral two times a day  pantoprazole    Tablet 40 milliGRAM(s) Oral two times a day  simvastatin 40 milliGRAM(s) Oral at bedtime  spironolactone 25 milliGRAM(s) Oral daily  sucralfate 1 Gram(s) Oral two times a day    MEDICATIONS  (PRN):  acetaminophen   Tablet .. 650 milliGRAM(s) Oral every 6 hours PRN Mild Pain (1 - 3)  traMADol 50 milliGRAM(s) Oral every 8 hours PRN Moderate Pain (4 - 6)      REVIEW OF SYSTEMS: Pt unable       Vital Signs Last 24 Hrs  T(C): 36.5 (05 Sep 2018 09:56), Max: 36.7 (04 Sep 2018 16:41)  T(F): 97.7 (05 Sep 2018 09:56), Max: 98 (04 Sep 2018 16:41)  HR: 76 (05 Sep 2018 09:56) (73 - 79)  BP: 144/62 (05 Sep 2018 09:56) (125/65 - 157/68)  BP(mean): --  RR: 17 (05 Sep 2018 09:56) (17 - 18)  SpO2: 97% (05 Sep 2018 09:56) (92% - 99%)    I&O's Summary      PHYSICAL EXAM:    Constitutional: NAD, awakeHEENT: PERR, EOMI,  No oral cyananosis.  Neck:  supple,  No JVD  Respiratory: Breath sounds are clear bilaterally, No wheezing, rales or rhonchi  Cardiovascular: S1 and S2, regular rate and rhythm, 1/6 NGOC  Gastrointestinal: Bowel Sounds present, soft, nontender.   Extremities: No peripheral edema. No clubbing or cyanosis.  Vascular: 2+ peripheral pulses  Neurological: Confused  Musculoskeletal: no calf tenderness.  Skin: No rashes.      LABS: All Labs Reviewed:                        14.0   6.41  )-----------( 186      ( 05 Sep 2018 05:35 )             42.1                         13.9   6.22  )-----------( 189      ( 04 Sep 2018 13:19 )             40.9     05 Sep 2018 05:35    140    |  103    |  24     ----------------------------<  130    4.1     |  29     |  1.19   04 Sep 2018 13:19    137    |  102    |  25     ----------------------------<  143    4.1     |  26     |  1.18     Ca    9.7        05 Sep 2018 05:35  Ca    9.2        04 Sep 2018 13:19  Mg     1.7       05 Sep 2018 05:35    TPro  7.3    /  Alb  3.7    /  TBili  0.5    /  DBili  x      /  AST  29     /  ALT  30     /  AlkPhos  96     04 Sep 2018 13:19      CARDIAC MARKERS ( 05 Sep 2018 05:35 )  0.023 ng/mL / x     / x     / x     / x      CARDIAC MARKERS ( 04 Sep 2018 23:56 )  0.029 ng/mL / x     / x     / x     / x      CARDIAC MARKERS ( 04 Sep 2018 17:00 )  0.029 ng/mL / x     / x     / x     / x      CARDIAC MARKERS ( 04 Sep 2018 13:19 )  0.021 ng/mL / x     / x     / x     / x          Blood Culture:    @ 13:19  Pro Bnp [id number]        RADIOLOGY/EKG:  < from: 12 Lead ECG (18 @ 13:08) >  Diagnosis Line Normal sinus rhythm  Left axis deviation  Voltage criteria for left ventricular hypertrophy  Abnormal ECG  When compared with ECG of 2018 12:16,  Premature ventricular complexes are no longer Present  Confirmed by ADELAIDA RODRIGUEZ MD ([id number]) on 2018 7:09:03 PM    < end of copied text >  < from: Transthoracic Echocardiogram (10.27.17 @ 09:52) >   Impression     Summary     The left ventricle cavity is borderline dilated. Left ventricle systolic   function appears preserved based on limited transthoracic views;   segmental   wall motion abnormalities can not be rule out.   Estimated left ventricular ejection fraction is 50-55 %.   The left atrium is mildly dilated.   The right atrium is not well seen.   An assessment of the right ventricular contractility (EF) cannot be made.   Significant fibrocalcificchanges noted to the aortic valve leaflets with   restriction in leaflet excursion. Peak transaortic gradient is 24mmHg;   this finding is consistent with mild aortic stenosis.   Fibrocalcific changes noted to the mitral valve leaflets with preserved   excursion. Moderate mitral regurgitation noted.   The tricuspid valve leaflets are well seen and appear thin and pliable   with preserved leaflets excursion. Mild tricuspid regurgitation noted.     Signature     ----------------------------------------------------------------   Electronically signed by Dami Mendez MD([facility]   physician) on 10/27/2017 06:36 PM   ----------------------------------------------------------------    < end of copied text >

## 2018-09-06 ENCOUNTER — TRANSCRIPTION ENCOUNTER (OUTPATIENT)
Age: 83
End: 2018-09-06

## 2018-09-06 VITALS
TEMPERATURE: 98 F | OXYGEN SATURATION: 95 % | DIASTOLIC BLOOD PRESSURE: 49 MMHG | RESPIRATION RATE: 17 BRPM | SYSTOLIC BLOOD PRESSURE: 104 MMHG | HEART RATE: 65 BPM

## 2018-09-06 LAB
ANION GAP SERPL CALC-SCNC: 11 MMOL/L — SIGNIFICANT CHANGE UP (ref 5–17)
BUN SERPL-MCNC: 37 MG/DL — HIGH (ref 7–23)
CALCIUM SERPL-MCNC: 9.4 MG/DL — SIGNIFICANT CHANGE UP (ref 8.5–10.1)
CHLORIDE SERPL-SCNC: 104 MMOL/L — SIGNIFICANT CHANGE UP (ref 96–108)
CO2 SERPL-SCNC: 23 MMOL/L — SIGNIFICANT CHANGE UP (ref 22–31)
CREAT SERPL-MCNC: 1.13 MG/DL — SIGNIFICANT CHANGE UP (ref 0.5–1.3)
GLUCOSE SERPL-MCNC: 130 MG/DL — HIGH (ref 70–99)
POTASSIUM SERPL-MCNC: 4 MMOL/L — SIGNIFICANT CHANGE UP (ref 3.5–5.3)
POTASSIUM SERPL-SCNC: 4 MMOL/L — SIGNIFICANT CHANGE UP (ref 3.5–5.3)
SODIUM SERPL-SCNC: 138 MMOL/L — SIGNIFICANT CHANGE UP (ref 135–145)
TSH SERPL-MCNC: 3.64 UU/ML — SIGNIFICANT CHANGE UP (ref 0.34–4.82)

## 2018-09-06 PROCEDURE — 93306 TTE W/DOPPLER COMPLETE: CPT | Mod: 26

## 2018-09-06 RX ORDER — METOPROLOL TARTRATE 50 MG
1 TABLET ORAL
Qty: 30 | Refills: 0 | OUTPATIENT
Start: 2018-09-06 | End: 2018-10-05

## 2018-09-06 RX ADMIN — HEPARIN SODIUM 5000 UNIT(S): 5000 INJECTION INTRAVENOUS; SUBCUTANEOUS at 06:48

## 2018-09-06 RX ADMIN — Medication 100 MILLIGRAM(S): at 11:56

## 2018-09-06 RX ADMIN — AMLODIPINE BESYLATE 10 MILLIGRAM(S): 2.5 TABLET ORAL at 06:47

## 2018-09-06 RX ADMIN — LOSARTAN POTASSIUM 25 MILLIGRAM(S): 100 TABLET, FILM COATED ORAL at 06:48

## 2018-09-06 RX ADMIN — ANASTROZOLE 1 MILLIGRAM(S): 1 TABLET ORAL at 14:12

## 2018-09-06 RX ADMIN — Medication 25 MILLIGRAM(S): at 06:48

## 2018-09-06 RX ADMIN — Medication 1 GRAM(S): at 06:48

## 2018-09-06 RX ADMIN — Medication 1000 UNIT(S): at 11:55

## 2018-09-06 RX ADMIN — PANTOPRAZOLE SODIUM 40 MILLIGRAM(S): 20 TABLET, DELAYED RELEASE ORAL at 06:47

## 2018-09-06 RX ADMIN — SPIRONOLACTONE 25 MILLIGRAM(S): 25 TABLET, FILM COATED ORAL at 06:48

## 2018-09-06 RX ADMIN — INFLUENZA VIRUS VACCINE 0.5 MILLILITER(S): 15; 15; 15; 15 SUSPENSION INTRAMUSCULAR at 12:25

## 2018-09-06 RX ADMIN — Medication 325 MILLIGRAM(S): at 06:48

## 2018-09-06 RX ADMIN — PREGABALIN 1000 MICROGRAM(S): 225 CAPSULE ORAL at 11:55

## 2018-09-06 NOTE — DISCHARGE NOTE ADULT - CARE PLAN
Principal Discharge DX:	Chest pain  Goal:	prevent recurrence  Assessment and plan of treatment:	take toprol 25 daily, follow up with cardiologist within 1 week  for outpatient echo  Secondary Diagnosis:	Back pain  Assessment and plan of treatment:	continue with current pain medications  Secondary Diagnosis:	Ductal carcinoma in situ (DCIS) of left breast  Assessment and plan of treatment:	continue outpatient monitoring  Secondary Diagnosis:	Esophageal erosions  Assessment and plan of treatment:	continue with current medications

## 2018-09-06 NOTE — DISCHARGE NOTE ADULT - CARE PROVIDER_API CALL
Scott Blanton), Internal Medicine  [address] [place] [id number]  Phone: [phone]  Fax: [phone]    Taurus Jeter), Cardiovascular Disease  [address] [place] [address]  Phone: [phone]  Fax: [phone]

## 2018-09-06 NOTE — DISCHARGE NOTE ADULT - PLAN OF CARE
prevent recurrence take toprol 25 daily, follow up with cardiologist within 1 week  for outpatient echo continue with current pain medications continue outpatient monitoring continue with current medications

## 2018-09-06 NOTE — DISCHARGE NOTE ADULT - MEDICATION SUMMARY - MEDICATIONS TO TAKE
I will START or STAY ON the medications listed below when I get home from the hospital:    spironolactone 25 mg oral tablet  -- 1 tab(s) by mouth once a day - home/hospital  -- Indication: For home meds    Mapap 500 mg oral tablet  -- 2 tab(s) by mouth 2 times a day, As Needed  -- Indication: For home meds    Nucynta ER 50 mg oral tablet, extended release  -- 1 tab(s) by mouth 2 times a day  -- Indication: For home meds    losartan 25 mg oral tablet  -- 1 tab(s) by mouth once a day  -- Indication: For home meds    Garrett-Gest 500 mg oral tablet, chewable  -- 2 tab(s) by mouth 4 times a day, As Needed  -- Indication: For home meds    allopurinol 100 mg oral tablet  --  by mouth once a day  -- Indication: For home meds    simvastatin 40 mg oral tablet  -- 1 tab(s) by mouth once a day (at bedtime) - Home/Hospital  -- Indication: For home meds    anastrozole 1 mg oral tablet  -- 1 tab(s) by mouth once a day  -- Indication: For home meds    metoprolol succinate 25 mg oral tablet, extended release  -- 1 tab(s) by mouth once a day  -- Indication: For SVT    amLODIPine 10 mg oral tablet  -- 1 tab(s) by mouth once a day  -- Indication: For home meds    Feosol 325 mg (65 mg elemental iron) oral tablet  -- 1 tab(s) by mouth 2 times a day  -- Indication: For home meds    sucralfate 1 g oral tablet  -- 1 tab(s) by mouth 2 times a day  -- Indication: For home meds    pantoprazole 40 mg oral delayed release tablet  -- 1 tab(s) by mouth 2 times a day (before meals)  -- Indication: For home meds    Vitamin B12  -- 1000 microgram(s) under tongue once a day  -- Indication: For home meds    Vitamin D3 1000 intl units oral capsule  -- 2  by mouth once a day  -- Indication: For home meds

## 2018-09-06 NOTE — DISCHARGE NOTE ADULT - OTHER SIGNIFICANT FINDINGS
Complete Blood Count + Automated Diff (09.05.18 @ 05:35)    WBC Count: 6.41 K/uL    RBC Count: 4.79 M/uL    Hemoglobin: 14.0 g/dL    Hematocrit: 42.1 %    Mean Cell Volume: 87.9 fl    Mean Cell Hemoglobin: 29.2 pg    Mean Cell Hemoglobin Conc: 33.3 gm/dL    Red Cell Distrib Width: 14.7 %    Platelet Count - Automated: 186 K/uL    Auto Neutrophil #: 4.87 K/uL    Auto Lymphocyte #: 0.86 K/uL    Auto Monocyte #: 0.63 K/uL    Auto Eosinophil #: 0.01 K/uL    Auto Basophil #: 0.01 K/uL    Auto Neutrophil %: 75.9: Differential percentages must be correlated with absolute numbers for  clinical significance. %    Auto Lymphocyte %: 13.4 %    Auto Monocyte %: 9.8 %    Auto Eosinophil %: 0.2 %    Auto Basophil %: 0.2 %    Auto Immature Granulocyte %: 0.5 %    Basic Metabolic Panel in AM (09.06.18 @ 07:46)    Sodium, Serum: 138 mmol/L    Potassium, Serum: 4.0 mmol/L    Chloride, Serum: 104 mmol/L    Carbon Dioxide, Serum: 23 mmol/L    Anion Gap, Serum: 11 mmol/L    Blood Urea Nitrogen, Serum: 37 mg/dL    Creatinine, Serum: 1.13 mg/dL    Glucose, Serum: 130 mg/dL    Calcium, Total Serum: 9.4 mg/dL   Thyroid Stimulating Hormone, Serum in AM (09.06.18 @ 07:46)    Thyroid Stimulating Hormone, Serum: 3.64 uU/mL    < from: Xray Chest 2 Views PA/Lat (09.04.18 @ 14:55) >  Findings:    The heart is enlarged. Retrocardiac hernia. The apices are unremarkable.   No focal consolidation. Elevation of the right hemidiaphragm..   Degenerative changes of the visualized osseous structures.    Impression:    No acute disease    No significant interval change as compared to  4/20/2018    < end of copied text >    < from: Transthoracic Echocardiogram (10.27.17 @ 09:52) >  The left ventricle cavity is borderline dilated. Left ventricle systolic   function appears preserved based on limited transthoracic views;   segmental   wall motion abnormalities can not be rule out.   Estimated left ventricular ejection fraction is 50-55 %.   The left atrium is mildly dilated.   The right atrium is not well seen.   An assessment of the right ventricular contractility (EF) cannot be made.   Significant fibrocalcificchanges noted to the aortic valve leaflets with   restriction in leaflet excursion. Peak transaortic gradient is 24mmHg;   this finding is consistent with mild aortic stenosis.   Fibrocalcific changes noted to the mitral valve leaflets with preserved   excursion. Moderate mitral regurgitation noted.   The tricuspid valve leaflets are well seen and appear thin and pliable   with preserved leaflets excursion. Mild tricuspid regurgitation noted.      < end of copied text >

## 2018-09-06 NOTE — DISCHARGE NOTE ADULT - PATIENT PORTAL LINK FT
You can access the [facility] Patient Portal, offered by [facility], by registering with the following website: [email or web address]

## 2018-09-06 NOTE — DISCHARGE NOTE ADULT - HOSPITAL COURSE
Subjective:  Patient is a 89y old  Female who presents with a chief complaint of chest pain   HPI:     89 yo female with PMH of dementia, HTN, HLD, h/o aortic dissection (being monitored as outpatient), breast CA, h/o GI bleed from AVMs, esophagitis, OA, chronic back pain sent to ED from [facility]  on 9/5/18 for chest pain and SOB. Pt is a very poor historian secondary to dementia. History obtained from son over the phone. As per son pt was complaining of chest pain this morning associated with SOB. Has had episodes in the past and workup has been negative. She does have a knows stable aortic dissection which is being monitored as outpatient. When speaking with patient she denies any chest pain and states she fell this morning. When speaking with son he states she always tells everyone she has fallen when she comes to ER but she has not had a fall. Currently resting comfortably with no complaints. No chest pain or SOB currently.   In ED cardiac enzymes x2 negative. Cardio was called who recommended observation overnight.   9/5/18 - Patient seen and examined at bedside earlier today, poor historian, confused , denies chest pain   9/6/18 - pt seen and examined, no events, noncompliant with tele monitor, denies CP   Review of system- Rest of the review of system are negative except mentioned in HPI  T(C): 36.7 (09-06-18 @ 09:37), Max: 36.7 (09-06-18 @ 09:37)  T(F): 98 (09-06-18 @ 09:37), Max: 98 (09-06-18 @ 09:37)  HR: 65 (09-06-18 @ 09:37) (65 - 78)  BP: 104/49 (09-06-18 @ 09:37) (104/49 - 145/56)  RR: 17 (09-06-18 @ 09:37) (16 - 18)  SpO2: 95% (09-06-18 @ 09:37) (95% - 95%)  Wt(kg): --  PHYSICAL EXAM:  GENERAL: NAD  NERVOUS SYSTEM:  Alert & Oriented X3, non- focal exam, Motor Strength 5/5 B/L upper and lower extremities; DTRs 2+ intact and symmetric  HEAD:  Atraumatic, Normocephalic  EYES: EOMI, PERRLA, conjunctiva and sclera clear  HEENT: Moist mucous membranes  NECK: Supple, No JVD  CHEST/LUNG: Clear to auscultation bilaterally; No rales, no rhonchi, no wheezing, or rubs  HEART: Regular rate and rhythm; + systolic  murmurs, rubs, or gallops  ABDOMEN: Soft, Nontender, Nondistended; Bowel sounds present  GENITOURINARY- Voiding, no suprapubic tenderness  EXTREMITIES:  2+ Peripheral Pulses, No clubbing, cyanosis, or edema  MUSCULOSKELETAL:- No muscle tenderness, Muscle tone normal, No joint tenderness, no Joint swelling, Joint range of motion-normal  SKIN-no rash, no lesion  · Assessment		  89 yo female with PMH of dementia, HTN, HLD, h/o aortic dissection (being monitored as outpatient), breast CA, h/o GI bleed from AVMs, esophagitis, OA, chronic back pain presented with chest pain.    #Chest pain, atypical, ruled out ACS , suspected GI etiology resolved   # systolic murmur due to mild AS  - place in observation in tele  - serial cardiac enzymes x2 negative, will obtain 3rd set  - cardio consult  - stable, add BB   - 2 d echo - EF 45-40%, mild AS  #HTN  - continue home meds  #HLD  - continue statin  #h.o GI bleed, esophagitis  - continue PPI and sucralfate  #Chronic back pain  - Nucynta, stop oxycodone - pt more delirious on it   - tramadol PRN for breakthrough pain  Disposition - medically optimized to be discharged to assisted living facility with close follow up with PCP, cardiology within 1 week  return to ED if fever, abdominal pain, nausea, vomiting, chest pain, dyspnea  Discharge plan discussed with patient, RN  Patient advised to follow up with PCP within 3-7 days  time spend 40 min  Discharge note faxed to PCP with my contact information to call me back

## 2019-01-09 ENCOUNTER — RX RENEWAL (OUTPATIENT)
Age: 84
End: 2019-01-09

## 2019-01-18 ENCOUNTER — RX RENEWAL (OUTPATIENT)
Age: 84
End: 2019-01-18

## 2019-03-04 ENCOUNTER — TRANSCRIPTION ENCOUNTER (OUTPATIENT)
Age: 84
End: 2019-03-04

## 2019-03-04 PROBLEM — R41.3 MEMORY LOSS: Status: ACTIVE | Noted: 2017-01-17

## 2019-05-22 ENCOUNTER — RX RENEWAL (OUTPATIENT)
Age: 84
End: 2019-05-22

## 2019-05-23 ENCOUNTER — RX RENEWAL (OUTPATIENT)
Age: 84
End: 2019-05-23

## 2019-05-23 RX ORDER — ANASTROZOLE TABLETS 1 MG/1
1 TABLET ORAL
Qty: 30 | Refills: 3 | Status: ACTIVE | COMMUNITY
Start: 2017-11-22 | End: 1900-01-01

## 2019-09-20 ENCOUNTER — RX RENEWAL (OUTPATIENT)
Age: 84
End: 2019-09-20

## 2019-09-21 ENCOUNTER — RX RENEWAL (OUTPATIENT)
Age: 84
End: 2019-09-21

## 2020-11-20 NOTE — ED ADULT NURSE NOTE - NS ED NURSE REPORT GIVEN DT

## 2021-03-12 NOTE — ED PROVIDER NOTE - ENMT, MLM
[FreeTextEntry1] : Patient denies chest pain, other than noted above full review of systems is unremarkable. Airway patent, Nasal mucosa clear. Mouth with normal mucosa. Throat has no vesicles, no oropharyngeal exudates and uvula is midline.

## 2021-08-17 NOTE — ED PROVIDER NOTE - PRINCIPAL DIAGNOSIS
I conducted the telehealth visit with patient.I discussed and endorsed patient care to accepting bedside physician Chest pain

## 2021-08-25 NOTE — ASU PREOP CHECKLIST - NS PREOP CHK HIBICLENS NA
ICD therapies and arrhythmia detection disabled per patient and family request.  Pacing remains on at AAI->DDD 70 bpm. N/A

## 2022-08-13 NOTE — ED ADULT TRIAGE NOTE - ARRIVAL FROM
Nursing home Patient seen by surgical PA in consultation with Dr. Olivo and after reviewing labs and CT, they state slight gastroenteritis which was noted on CT when they reviewed it.  Patient is feeling much improved, with no acute pain at this time.  They want patient to be discharged with Protonix, bland diet.  Discussed the results of all diagnostic testing in ED.  An opportunity to ask questions was given.  Discussed the nature of diagnostic testing in the abdomen and the importance of continued reevaluation.  Understanding of the potential risk of occult pathology was verbalized and the patient will continue to follow-up as an outpatient for further workup and evaluation until resolution.  Discussed the importance of prompt, close medical follow-up.  Patient will return with any changes, concerns or persistent / worsening symptoms.  Patient with elevated blood pressure in the emergency room.  Patient states that this is common for her, when she is in the emergency room.  Patient will follow her blood pressure at home and will return to ER if having significant or persistent elevation.

## 2023-12-13 NOTE — ED ADULT NURSE NOTE - PAIN RATING/NUMBER SCALE (0-10): ACTIVITY
From: Esequiel Santana  To: Ramiro Reece  Sent: 12/13/2023 3:28 PM CST  Subject: Samuel Oliver Settler and team,    I wanted to reach out to you as iv been sweating in my arm pits a lot more than normal and I was wondering if I could get a script for DrySol or open to other suggestions? If so, we can send to the pharmacy here. Usually, I switch up my deodorant but this time it doesn't seem to matter what I am using I just keep sweating a lot. I used it before in the past.     Thanks! 0

## 2024-01-08 NOTE — ASU PATIENT PROFILE, ADULT - PATIENT REPRESENTATIVE PHONE
Detail Level: Detailed Add [id number] Cpt? (Important Note: In 2017 The Use Of 38648 Is Being Tracked By Cms To Determine Future Global Period Reimbursement For Global Periods): no [phone]

## 2024-01-22 NOTE — PATIENT PROFILE ADULT. - IS PATIENT PREGNANT?
What Type Of Note Output Would You Prefer (Optional)?: Bullet Format Hpi Title: Evaluation of a Skin Lesion How Severe Are Your Spot(S)?: mild no